# Patient Record
Sex: FEMALE | Race: WHITE | NOT HISPANIC OR LATINO | Employment: OTHER | URBAN - METROPOLITAN AREA
[De-identification: names, ages, dates, MRNs, and addresses within clinical notes are randomized per-mention and may not be internally consistent; named-entity substitution may affect disease eponyms.]

---

## 2017-06-30 ENCOUNTER — TELEPHONE (OUTPATIENT)
Dept: INTERNAL MEDICINE | Facility: CLINIC | Age: 57
End: 2017-06-30

## 2017-06-30 DIAGNOSIS — R09.81 SINUS CONGESTION: Primary | ICD-10-CM

## 2017-06-30 DIAGNOSIS — J31.0 CHRONIC RHINITIS, UNSPECIFIED TYPE: ICD-10-CM

## 2017-06-30 NOTE — TELEPHONE ENCOUNTER
Pt called. Relayed below. Pt stated her nose has been plugged up, and has had drainage pretty much her whole life. Also pt has SOB when going up stairs. Pt is wondering if there is something wrong with her nose, or septum, or something.       Mya-not sure what dx to use?

## 2017-06-30 NOTE — TELEPHONE ENCOUNTER
FHN: [facility] [phone]   [email or web address]  Referral     If she is more SOB with activity we should do more testing  If this is normal for her, ENT referral ok

## 2017-06-30 NOTE — TELEPHONE ENCOUNTER
Per Mya-pt is sched for an appt on 7/6 for an ENT referral. Can just do referral w/o being seen. Just needs to find out what she needs referral for        Called pt-left message     When referral done, cxl appt in Epic

## 2017-09-15 ENCOUNTER — OFFICE VISIT (OUTPATIENT)
Dept: INTERNAL MEDICINE | Facility: CLINIC | Age: 57
End: 2017-09-15
Payer: COMMERCIAL

## 2017-09-15 VITALS
BODY MASS INDEX: 26.06 KG/M2 | DIASTOLIC BLOOD PRESSURE: 64 MMHG | OXYGEN SATURATION: 96 % | HEART RATE: 94 BPM | SYSTOLIC BLOOD PRESSURE: 96 MMHG | TEMPERATURE: 98.2 F | WEIGHT: 166 LBS | HEIGHT: 67 IN

## 2017-09-15 DIAGNOSIS — E78.00 HYPERCHOLESTEREMIA: ICD-10-CM

## 2017-09-15 DIAGNOSIS — Z13.6 CARDIOVASCULAR SCREENING; LDL GOAL LESS THAN 130: ICD-10-CM

## 2017-09-15 DIAGNOSIS — Z00.01 ENCOUNTER FOR GENERAL ADULT MEDICAL EXAMINATION WITH ABNORMAL FINDINGS: Primary | ICD-10-CM

## 2017-09-15 DIAGNOSIS — G47.19 EXCESSIVE DAYTIME SLEEPINESS: ICD-10-CM

## 2017-09-15 DIAGNOSIS — R06.09 DOE (DYSPNEA ON EXERTION): ICD-10-CM

## 2017-09-15 DIAGNOSIS — R53.83 OTHER FATIGUE: ICD-10-CM

## 2017-09-15 LAB
BASOPHILS # BLD AUTO: 0.1 10E9/L (ref 0–0.2)
BASOPHILS NFR BLD AUTO: 0.8 %
DIFFERENTIAL METHOD BLD: NORMAL
EOSINOPHIL # BLD AUTO: 0.3 10E9/L (ref 0–0.7)
EOSINOPHIL NFR BLD AUTO: 4.2 %
ERYTHROCYTE [DISTWIDTH] IN BLOOD BY AUTOMATED COUNT: 12.8 % (ref 10–15)
HCT VFR BLD AUTO: 44.4 % (ref 35–47)
HGB BLD-MCNC: 14.5 G/DL (ref 11.7–15.7)
LYMPHOCYTES # BLD AUTO: 2.4 10E9/L (ref 0.8–5.3)
LYMPHOCYTES NFR BLD AUTO: 40.4 %
MCH RBC QN AUTO: 30.5 PG (ref 26.5–33)
MCHC RBC AUTO-ENTMCNC: 32.7 G/DL (ref 31.5–36.5)
MCV RBC AUTO: 94 FL (ref 78–100)
MONOCYTES # BLD AUTO: 0.5 10E9/L (ref 0–1.3)
MONOCYTES NFR BLD AUTO: 8.3 %
NEUTROPHILS # BLD AUTO: 2.7 10E9/L (ref 1.6–8.3)
NEUTROPHILS NFR BLD AUTO: 46.3 %
PLATELET # BLD AUTO: 221 10E9/L (ref 150–450)
RBC # BLD AUTO: 4.75 10E12/L (ref 3.8–5.2)
WBC # BLD AUTO: 5.9 10E9/L (ref 4–11)

## 2017-09-15 PROCEDURE — 83550 IRON BINDING TEST: CPT | Performed by: NURSE PRACTITIONER

## 2017-09-15 PROCEDURE — 80061 LIPID PANEL: CPT | Performed by: NURSE PRACTITIONER

## 2017-09-15 PROCEDURE — 36415 COLL VENOUS BLD VENIPUNCTURE: CPT | Performed by: NURSE PRACTITIONER

## 2017-09-15 PROCEDURE — 99396 PREV VISIT EST AGE 40-64: CPT | Performed by: NURSE PRACTITIONER

## 2017-09-15 PROCEDURE — 80050 GENERAL HEALTH PANEL: CPT | Performed by: NURSE PRACTITIONER

## 2017-09-15 PROCEDURE — 82306 VITAMIN D 25 HYDROXY: CPT | Performed by: NURSE PRACTITIONER

## 2017-09-15 PROCEDURE — 83540 ASSAY OF IRON: CPT | Performed by: NURSE PRACTITIONER

## 2017-09-15 PROCEDURE — 82728 ASSAY OF FERRITIN: CPT | Performed by: NURSE PRACTITIONER

## 2017-09-15 NOTE — MR AVS SNAPSHOT
After Visit Summary   9/15/2017    Shira Li    MRN: [id number]           Patient Information     Date Of Birth          1960        Visit Information        Provider Department      9/15/2017 9:20 AM Mya Pino APRN [facility]        Today's Diagnoses     Encounter for general adult medical examination with abnormal findings    -  1    Other fatigue        CARDIOVASCULAR SCREENING; LDL GOAL LESS THAN 130        Hypercholesteremia        MUNOZ (dyspnea on exertion)        Excessive daytime sleepiness          Care Instructions    Lab in [address]    [facility] [phone]   [email or web address]  Call for an appointment      [facility] [phone]  Call for an appointment           Follow-ups after your visit        Additional Services     PULMONARY MEDICINE REFERRAL       Your provider has referred you to: [facility] [phone]   [email or web address]    Please be aware that coverage of these services is subject to the terms and limitations of your health insurance plan.  Call member services at your health plan with any benefit or coverage questions.      Please bring the following with you to your appointment:    (1) Any X-Rays, CTs or MRIs which have been performed.  Contact the facility where they were done to arrange for  prior to your scheduled appointment.    (2) List of current medications   (3) This referral request   (4) Any documents/labs given to you for this referral            SLEEP EVALUATION & MANAGEMENT REFERRAL - ADULT       Please be aware that coverage of these services is subject to the terms and limitations of your health insurance plan.  Call member services at your health plan with any benefit or coverage questions.      Please bring the following to your appointment:    >>   List of current medications   >>   This referral  "request   >>   Any documents/labs given to you for this referral    Other:  [facility] [phone]                  Future tests that were ordered for you today     Open Future Orders        Priority Expected Expires Ordered    SLEEP EVALUATION & MANAGEMENT REFERRAL - ADULT Routine  9/15/2018 9/15/2017            Who to contact     If you have questions or need follow up information about today's clinic visit or your schedule please contact [facility] directly at [phone].  Normal or non-critical lab and imaging results will be communicated to you by MyChart, letter or phone within 4 business days after the clinic has received the results. If you do not hear from us within 7 days, please contact the clinic through spigithart or phone. If you have a critical or abnormal lab result, we will notify you by phone as soon as possible.  Submit refill requests through [employer] or call your pharmacy and they will forward the refill request to us. Please allow 3 business days for your refill to be completed.          Additional Information About Your Visit        [facility]itThe [facility]POTAT[facility]OFT Information     TheBankCloud lets you send messages to your doctor, view your test results, renew your prescriptions, schedule appointments and more. To sign up, go to [email or web address] . Click on \"Log in\" on the left side of the screen, which will take you to the Welcome page. Then click on \"Sign up Now\" on the right side of the page.     You will be asked to enter the access code listed below, as well as some personal information. Please follow the directions to create your username and password.     Your access code is: [email or web address]  Expires: 2017 10:22 AM     Your access code will  in 90 days. If you need help or a new code, please call your [facility] or [phone].        Care EveryWhere ID     This is your Care EveryWhere ID. This could be used by other organizations to access your " "[place] medical records  [id number]        Your Vitals Were     Pulse Temperature Height Last Period Pulse Oximetry BMI (Body Mass Index)    94 98.2  F (36.8  C) (Oral) 5' 7\" (1.702 m) 05/18/2012 96% 26 kg/m2       Blood Pressure from Last 3 Encounters:   09/15/17 96/64   04/27/16 114/78   03/16/15 106/80    Weight from Last 3 Encounters:   09/15/17 166 lb (75.3 kg)   04/27/16 163 lb 1.6 oz (74 kg)   03/16/15 157 lb 8 oz (71.4 kg)              We Performed the Following     CBC with platelets differential     Comprehensive metabolic panel     Ferritin     Iron and iron binding capacity     Lipid panel reflex to direct LDL     PULMONARY MEDICINE REFERRAL     TSH with free T4 reflex     Vitamin D Deficiency        Primary Care Provider Office Phone # Fax #    YSABEL Preciado [facility] [phone]       [address] [facility] [address]        Equal Access to Services     GIANCARLO PIERRE : Hadii aad ku hadasho Soomaali, waaxda luqadaha, qaybta kaalmada adeegyada, waxay idiin hayaan adeeg khaltaf altamirano . So [facility] [phone].    ATENCIÓN: Si habla español, tiene a gray disposición servicios gratuitos de asistencia lingüística. Llame al [phone].    We comply with applicable federal civil rights laws and [place] laws. We do not discriminate on the basis of race, color, national origin, age, disability sex, sexual orientation or gender identity.            Thank you!     Thank you for choosing [facility]  for your care. Our goal is always to provide you with excellent care. Hearing back from our patients is one way we can continue to improve our services. Please take a few minutes to complete the written survey that you may receive in the mail after your visit with us. Thank you!             Your Updated Medication List - Protect others around you: Learn how to safely use, store and throw away your medicines at www.disposemymeds.org.          This list is accurate as of: " 9/15/17 10:22 AM.  Always use your most recent med list.                   Brand Name Dispense Instructions for use Diagnosis    ADVIL 200 MG tablet   Generic drug:  ibuprofen      Take 400 mg by mouth every 6 hours as needed for mild pain        CALCIUM 1000 + D 1000-800 MG-UNIT Tabs   Generic drug:  Calcium Carb-Cholecalciferol           Multiple vitamin  s Tabs     30 tablet

## 2017-09-15 NOTE — NURSING NOTE
"Chief Complaint   Patient presents with     Physical     fasting       Initial BP 96/64 (BP Location: Left arm, Patient Position: Sitting, Cuff Size: Adult Large)  Pulse 94  Temp 98.2  F (36.8  C) (Oral)  Ht 5' 7\" (1.702 m)  Wt 166 lb (75.3 kg)  LMP 05/18/2012  SpO2 96%  BMI 26 kg/m2 Estimated body mass index is 26 kg/(m^2) as calculated from the following:    Height as of this encounter: 5' 7\" (1.702 m).    Weight as of this encounter: 166 lb (75.3 kg).  Medication Reconciliation: complete    "

## 2017-09-15 NOTE — PATIENT INSTRUCTIONS
Lab in [address]    [facility] [place] [facility] [phone]   [email or web address]  Call for an appointment      [facility] [phone]  Call for an appointment

## 2017-09-15 NOTE — PROGRESS NOTES
SUBJECTIVE:   CC: Shira Li is an 56 year old woman who presents for preventive health visit.     Physical   Annual:     Getting at least 3 servings of Calcium per day::  NO    Bi-annual eye exam::  Yes    Dental care twice a year::  NO    Sleep apnea or symptoms of sleep apnea::  Daytime drowsiness    Diet::  Gluten-free/reduced, Breakfast skipped and Other    Frequency of exercise::  6-7 days/week    Duration of exercise::  45-60 minutes    Taking medications regularly::  Not Applicable    Medication side effects::  Other    Additional concerns today::  YES      MUNOZ continues with walking up an incline  She does and hour on stationary bike without problem   She has had angiogram with clear vessels - done for abnormal stress test   Has not done pulmonary or sleep referral given her last year  Will do now  Her father  in January and now has more time to do her own health care     Has to be vigilant about her diet ; cheat with pizza once a week- this may be bothering her   No gluten or wheat usually         Today's PHQ-2 Score:   PHQ-2 (  [employer]) 9/15/2017   Q1: Little interest or pleasure in doing things 0   Q2: Feeling down, depressed or hopeless 0   PHQ-2 Score 0   Q1: Little interest or pleasure in doing things Not at all   Q2: Feeling down, depressed or hopeless Not at all   PHQ-2 Score 0       Abuse: Current or Past(Physical, Sexual or Emotional)- No  Do you feel safe in your environment - Yes    Social History   Substance Use Topics     Smoking status: Never Smoker     Smokeless tobacco: Never Used     Alcohol use Yes      Comment: 1 drink per month     The patient does not drink >3 drinks per day nor >7 drinks per week.    Reviewed orders with patient.  Reviewed health maintenance and updated orders accordingly - Yes          Pertinent mammograms are reviewed under the imaging tab.  History of abnormal Pap smear: NO - age 30-65 PAP every 5 years with negative HPV co-testing  "recommended    Reviewed and updated as needed this visit by clinical staff  Tobacco  Allergies  Meds  Med Hx  Surg Hx  Fam Hx  Soc Hx        Reviewed and updated as needed this visit by Provider  Allergies              ROS:  C: NEGATIVE for fever, chills, change in weight  I: NEGATIVE for worrisome rashes, moles or lesions  E: NEGATIVE for vision changes or irritation  ENT: NEGATIVE for ear, mouth and throat problems  R: NEGATIVE for significant cough or SOB  B: NEGATIVE for masses, tenderness or discharge  CV: NEGATIVE for chest pain, palpitations or peripheral edema  GI: NEGATIVE for nausea, abdominal pain, heartburn, or change in bowel habits  : NEGATIVE for unusual urinary or vaginal symptoms. No vaginal bleeding.  M: NEGATIVE for significant arthralgias or myalgia  N: NEGATIVE for weakness, dizziness or paresthesias  P: NEGATIVE for changes in mood or affect      OBJECTIVE:   BP 96/64 (BP Location: Left arm, Patient Position: Sitting, Cuff Size: Adult Large)  Pulse 94  Temp 98.2  F (36.8  C) (Oral)  Ht 5' 7\" (1.702 m)  Wt 166 lb (75.3 kg)  LMP 05/18/2012  SpO2 96%  BMI 26 kg/m2  EXAM:  GENERAL APPEARANCE:  alert and no distress  EYES: Eyes grossly normal to inspection, and conjunctivae and sclerae normal  HENT: ear canals and TM's normal, nose and mouth without ulcers or lesions, oropharynx clear and oral mucous membranes moist  NECK: no adenopathy, no asymmetry, masses, or scars and thyroid normal to palpation  RESP: lungs clear to auscultation - no rales, rhonchi or wheezes  BREAST: normal without masses, tenderness or nipple discharge and no palpable axillary masses or adenopathy  CV: regular rate and rhythm, normal S1 S2, no S3 or S4, no murmur, click or rub, no peripheral edema and peripheral pulses strong  ABDOMEN: soft, nontender, no hepatosplenomegaly, no masses and bowel sounds normal  MS: no musculoskeletal defects are noted and gait is age appropriate without ataxia  SKIN: no " "suspicious lesions or rashes  NEURO: Normal strength and tone, sensory exam grossly normal, mentation intact and speech normal  PSYCH: mentation appears normal and affect normal/bright    ASSESSMENT/PLAN:       ICD-10-CM    1. Encounter for general adult medical examination with abnormal findings Z00.01 CBC with platelets differential     Iron and iron binding capacity     Ferritin     TSH with free T4 reflex     Lipid panel reflex to direct LDL     Comprehensive metabolic panel   2. Other fatigue R53.83 CBC with platelets differential     Iron and iron binding capacity     Ferritin     TSH with free T4 reflex     Lipid panel reflex to direct LDL     Comprehensive metabolic panel     Vitamin D Deficiency   3. CARDIOVASCULAR SCREENING; LDL GOAL LESS THAN 130 Z13.6 Lipid panel reflex to direct LDL     Comprehensive metabolic panel   4. Hypercholesteremia E78.00 Lipid panel reflex to direct LDL     Comprehensive metabolic panel   5. MUNOZ (dyspnea on exertion) R06.09 PULMONARY MEDICINE REFERRAL     SLEEP EVALUATION & MANAGEMENT REFERRAL - ADULT     Vitamin D Deficiency   6. Excessive daytime sleepiness G47.19 PULMONARY MEDICINE REFERRAL     SLEEP EVALUATION & MANAGEMENT REFERRAL - ADULT       COUNSELING:  Reviewed preventive health counseling, as reflected in patient instructions       Regular exercise       Healthy diet/nutrition       Osteoporosis Prevention/Bone Health         reports that she has never smoked. She has never used smokeless tobacco.    Estimated body mass index is 26 kg/(m^2) as calculated from the following:    Height as of this encounter: 5' 7\" (1.702 m).    Weight as of this encounter: 166 lb (75.3 kg).         Counseling Resources:  ATP IV Guidelines  Pooled Cohorts Equation Calculator  Breast Cancer Risk Calculator  FRAX Risk Assessment  ICSI Preventive Guidelines  Dietary Guidelines for Americans, 2010  USDA's MyPlate  ASA Prophylaxis  Lung CA Screening    YSABEL Preciado [facility]  [facility] " [facility] for HPI/ROS submitted by the patient on 9/15/2017   PHQ-2 Score: 0

## 2017-09-16 LAB
ALBUMIN SERPL-MCNC: 4.1 G/DL (ref 3.4–5)
ALP SERPL-CCNC: 96 U/L (ref 40–150)
ALT SERPL W P-5'-P-CCNC: 20 U/L (ref 0–50)
ANION GAP SERPL CALCULATED.3IONS-SCNC: 14 MMOL/L (ref 3–14)
AST SERPL W P-5'-P-CCNC: 14 U/L (ref 0–45)
BILIRUB SERPL-MCNC: 0.5 MG/DL (ref 0.2–1.3)
BUN SERPL-MCNC: 16 MG/DL (ref 7–30)
CALCIUM SERPL-MCNC: 9.2 MG/DL (ref 8.5–10.1)
CHLORIDE SERPL-SCNC: 104 MMOL/L (ref 94–109)
CHOLEST SERPL-MCNC: 209 MG/DL
CO2 SERPL-SCNC: 25 MMOL/L (ref 20–32)
CREAT SERPL-MCNC: 0.78 MG/DL (ref 0.52–1.04)
DEPRECATED CALCIDIOL+CALCIFEROL SERPL-MC: 56 UG/L (ref 20–75)
FERRITIN SERPL-MCNC: 43 NG/ML (ref 8–252)
GFR SERPL CREATININE-BSD FRML MDRD: 76 ML/MIN/1.7M2
GLUCOSE SERPL-MCNC: 89 MG/DL (ref 70–99)
HDLC SERPL-MCNC: 58 MG/DL
IRON SATN MFR SERPL: 24 % (ref 15–46)
IRON SERPL-MCNC: 85 UG/DL (ref 35–180)
LDLC SERPL CALC-MCNC: 118 MG/DL
NONHDLC SERPL-MCNC: 151 MG/DL
POTASSIUM SERPL-SCNC: 4.1 MMOL/L (ref 3.4–5.3)
PROT SERPL-MCNC: 7.9 G/DL (ref 6.8–8.8)
SODIUM SERPL-SCNC: 143 MMOL/L (ref 133–144)
TIBC SERPL-MCNC: 355 UG/DL (ref 240–430)
TRIGL SERPL-MCNC: 164 MG/DL
TSH SERPL DL<=0.005 MIU/L-ACNC: 2.07 MU/L (ref 0.4–4)

## 2018-03-05 ENCOUNTER — TELEPHONE (OUTPATIENT)
Dept: INTERNAL MEDICINE | Facility: CLINIC | Age: 58
End: 2018-03-05

## 2018-03-05 NOTE — TELEPHONE ENCOUNTER
3/5/2018    Call Regarding Preventive Health Screening Mammogram and Cervical/PAP    Attempt 1    Message on voicemail    Comments:       Outreach   Misa Banuelos

## 2018-03-13 NOTE — TELEPHONE ENCOUNTER
3/13/2018    Call Regarding Preventive Health Screening Mammogram and Cervical/PAP    Attempt 2    Message on voicemail    Comments:       Outreach   Misa Banuelos

## 2018-03-20 NOTE — TELEPHONE ENCOUNTER
3/20/2018    Call Regarding Preventive Health Screening Mammogram and Cervical/PAP    Attempt 3    Message on voicemail     Comments:       Outreach   CC

## 2018-04-08 ENCOUNTER — HEALTH MAINTENANCE LETTER (OUTPATIENT)
Age: 58
End: 2018-04-08

## 2018-04-23 ENCOUNTER — TRANSFERRED RECORDS (OUTPATIENT)
Dept: HEALTH INFORMATION MANAGEMENT | Facility: CLINIC | Age: 58
End: 2018-04-23

## 2018-06-18 ENCOUNTER — RADIANT APPOINTMENT (OUTPATIENT)
Dept: GENERAL RADIOLOGY | Facility: CLINIC | Age: 58
End: 2018-06-18
Payer: COMMERCIAL

## 2018-06-18 ENCOUNTER — OFFICE VISIT (OUTPATIENT)
Dept: INTERNAL MEDICINE | Facility: CLINIC | Age: 58
End: 2018-06-18
Payer: COMMERCIAL

## 2018-06-18 VITALS
HEART RATE: 67 BPM | SYSTOLIC BLOOD PRESSURE: 102 MMHG | OXYGEN SATURATION: 96 % | TEMPERATURE: 98 F | DIASTOLIC BLOOD PRESSURE: 64 MMHG

## 2018-06-18 DIAGNOSIS — B37.0 THRUSH: ICD-10-CM

## 2018-06-18 DIAGNOSIS — R05.9 COUGH: Primary | ICD-10-CM

## 2018-06-18 DIAGNOSIS — R05.9 COUGH: ICD-10-CM

## 2018-06-18 PROCEDURE — 36415 COLL VENOUS BLD VENIPUNCTURE: CPT | Performed by: INTERNAL MEDICINE

## 2018-06-18 PROCEDURE — 99214 OFFICE O/P EST MOD 30 MIN: CPT | Performed by: INTERNAL MEDICINE

## 2018-06-18 PROCEDURE — 86480 TB TEST CELL IMMUN MEASURE: CPT | Performed by: INTERNAL MEDICINE

## 2018-06-18 PROCEDURE — 71046 X-RAY EXAM CHEST 2 VIEWS: CPT

## 2018-06-18 RX ORDER — ALBUTEROL SULFATE 90 UG/1
2 AEROSOL, METERED RESPIRATORY (INHALATION) EVERY 6 HOURS PRN
Qty: 1 INHALER | Refills: 1 | Status: SHIPPED | OUTPATIENT
Start: 2018-06-18 | End: 2019-09-06

## 2018-06-18 RX ORDER — FLUCONAZOLE 100 MG/1
TABLET ORAL
Qty: 15 TABLET | Refills: 0 | Status: SHIPPED | OUTPATIENT
Start: 2018-06-18 | End: 2019-09-06

## 2018-06-18 NOTE — MR AVS SNAPSHOT
"              After Visit Summary   6/18/2018    Shira Li    MRN: [id number]           Patient Information     Date Of Birth          1960        Visit Information        Provider Department      6/18/2018 11:15 AM Stan Elam MD [facility]        Today's Diagnoses     Cough    -  1    Thrush           Follow-ups after your visit        Your next 10 appointments already scheduled     Jun 19, 2018 11:00 AM CDT   SHORT with Jyoti Hernadez PA-C   [facility]    [address]  [facility] [address]   [phone]              Who to contact     If you have questions or need follow up information about today's clinic visit or your schedule please contact [facility] directly at [phone].  Normal or non-critical lab and imaging results will be communicated to you by MyChart, letter or phone within 4 business days after the clinic has received the results. If you do not hear from us within 7 days, please contact the clinic through MyChart or phone. If you have a critical or abnormal lab result, we will notify you by phone as soon as possible.  Submit refill requests through Zeer or call your pharmacy and they will forward the refill request to us. Please allow 3 business days for your refill to be completed.          Additional Information About Your Visit        MyChart Information     Zeer lets you send messages to your doctor, view your test results, renew your prescriptions, schedule appointments and more. To sign up, go to [email or web address] . Click on \"Log in\" on the left side of the screen, which will take you to the Welcome page. Then click on \"Sign up Now\" on the right side of the page.     You will be asked to enter the access code listed below, as well as some personal information. Please follow the directions to create your username and password.     Your access code is: " [email or web address]  Expires: 2018  2:53 PM     Your access code will  in 90 days. If you need help or a new code, please call your [place] clinic or [phone].        Care EveryWhere ID     This is your Care EveryWhere ID. This could be used by other organizations to access your [place] medical records  [id number]        Your Vitals Were     Pulse Temperature Last Period Pulse Oximetry          67 98  F (36.7  C) 2012 96%         Blood Pressure from Last 3 Encounters:   18 102/64   09/15/17 96/64   16 114/78    Weight from Last 3 Encounters:   09/15/17 166 lb (75.3 kg)   16 163 lb 1.6 oz (74 kg)   03/16/15 157 lb 8 oz (71.4 kg)              We Performed the Following     M Tuberculosis by Quantiferon          Today's Medication Changes          These changes are accurate as of 18  2:53 PM.  If you have any questions, ask your nurse or doctor.               Start taking these medicines.        Dose/Directions    albuterol 108 (90 Base) MCG/ACT Inhaler   Commonly known as:  PROAIR HFA/PROVENTIL HFA/VENTOLIN HFA   Used for:  Cough   Started by:  Stan Elam MD        Dose:  2 puff   Inhale 2 puffs into the lungs every 6 hours as needed for shortness of breath / dyspnea or wheezing   Quantity:  1 Inhaler   Refills:  1       fluconazole 100 MG tablet   Commonly known as:  DIFLUCAN   Used for:  Thrush   Started by:  Stan Elam MD        Take 100mg daily for 3 days (extra tablets given)   Quantity:  15 tablet   Refills:  0            Where to get your medicines      These medications were sent to [facility] [address] [place] [address] [address] AT [place] [address]  [id number] [place] [address]     Phone:  [phone]     albuterol 108 (90 Base) MCG/ACT Inhaler    fluconazole 100 MG tablet                Primary Care Provider Office Phone # Fax #    YSABEL Preciado [phone]       [address]  [facility] [address]        Equal Access to Services     [facility] Medical CenterHUNTER : Hadii imani cervantes carlos a Arevalo, waaxda luqadaha, qaybta kaalvinasudhir barragan, christophe espinoalanamarco antonio sy. So [facility] [phone].    ATENCIÓN: Si habla español, tiene a gray disposición servicios gratuitos de asistencia lingüística. Jennaame al [phone].    We comply with applicable federal civil rights laws and [place] laws. We do not discriminate on the basis of race, color, national origin, age, disability, sex, sexual orientation, or gender identity.            Thank you!     Thank you for choosing [facility]  for your care. Our goal is always to provide you with excellent care. Hearing back from our patients is one way we can continue to improve our services. Please take a few minutes to complete the written survey that you may receive in the mail after your visit with us. Thank you!             Your Updated Medication List - Protect others around you: Learn how to safely use, store and throw away your medicines at www.disposemymeds.org.          This list is accurate as of 6/18/18  2:53 PM.  Always use your most recent med list.                   Brand Name Dispense Instructions for use Diagnosis    ADVIL 200 MG tablet   Generic drug:  ibuprofen      Take 400 mg by mouth every 6 hours as needed for mild pain        albuterol 108 (90 Base) MCG/ACT Inhaler    PROAIR HFA/PROVENTIL HFA/VENTOLIN HFA    1 Inhaler    Inhale 2 puffs into the lungs every 6 hours as needed for shortness of breath / dyspnea or wheezing    Cough       CALCIUM 1000 + D 1000-800 MG-UNIT Tabs   Generic drug:  Calcium Carb-Cholecalciferol           fluconazole 100 MG tablet    DIFLUCAN    15 tablet    Take 100mg daily for 3 days (extra tablets given)    Thrush       Multiple vitamin  s Tabs     30 tablet

## 2018-06-18 NOTE — PROGRESS NOTES
SUBJECTIVE:   Shira Li is a 57 year old female who presents to clinic today for the following health issues:      Cough  Patient presents with dry cough two weeks after trip to [place]    This cough started during her trip and continued after she returned    She has had no fevers.  She also had episode of thrush on her tongue, she had diflucan at home from before and took one and thrush improved but still present.     No hemoptysis.          Problem list and histories reviewed & adjusted, as indicated.  Additional history: as documented    Patient Active Problem List   Diagnosis     Encounter for counseling     CARDIOVASCULAR SCREENING; LDL GOAL LESS THAN 130     Hollenhorst plaque, left eye     Advanced directives, counseling/discussion     Hypercholesteremia     MUNOZ (dyspnea on exertion)     Status post coronary angiogram     Past Surgical History:   Procedure Laterality Date     HEART CATH LEFT HEART CATH  03-06-15    Angiographic normal coronary arteries      ORTHOPEDIC SURGERY  1967    leg fracture        Social History   Substance Use Topics     Smoking status: Never Smoker     Smokeless tobacco: Never Used     Alcohol use Yes      Comment: 1 drink per month     Family History   Problem Relation Age of Onset     DIABETES Mother       at age 30 yrs     C.A.D. Father 48     stents and CABG     Lipids Father      Coronary Artery Disease Father      MI     Allergy (Severe) Father      very allergic to medication     HEART DISEASE Other      multiple family members wt valve replacement      Obesity Other      mult females with morbid obesity.     CEREBROVASCULAR DISEASE Maternal Grandmother      Myocardial Infarction Maternal Grandmother      multiple MI's      Arthritis Maternal Grandmother      RA     Hypertension Maternal Grandmother      Psychotic Disorder Son      Asbergers      Coronary Artery Disease Maternal Grandfather      MI           Reviewed and updated as needed this visit by  clinical staff       Reviewed and updated as needed this visit by Provider         ROS:  Constitutional, HEENT, cardiovascular, pulmonary, gi and gu systems are negative, except as otherwise noted.    OBJECTIVE:     /64  Pulse 67  Temp 98  F (36.7  C)  LMP 05/18/2012  SpO2 96%  There is no height or weight on file to calculate BMI.  GENERAL: healthy, alert and no distress  HENT: Oral mucosa and tongue with white plaque  NECK: no adenopathy, no asymmetry, masses, or scars and thyroid normal to palpation  RESP: lungs clear to auscultation -with faint end expiratory wheezing  CV: regular rate and rhythm, normal S1 S2, no S3 or S4, no murmur, click or rub, no peripheral edema and peripheral pulses strong  MS: no gross musculoskeletal defects noted, no edema    Diagnostic Test Results:  none     ASSESSMENT/PLAN:       1. Cough    Cough in a returning traveler from [facility].    Again discussed with patient, similar to her son-this is likely viral in nature.     I considered MERS however they didn't have any travel directly in [place] so less likely    Will get cxr and TB test to rule out any possibility of TB.    Albuterol prn for wheezing      - XR Chest 2 Views; Future  - M Tuberculosis by Quantiferon  - albuterol (PROAIR HFA/PROVENTIL HFA/VENTOLIN HFA) 108 (90 Base) MCG/ACT Inhaler; Inhale 2 puffs into the lungs every 6 hours as needed for shortness of breath / dyspnea or wheezing  Dispense: 1 Inhaler; Refill: 1    2. Thrush  Exam showing some evidence of thrush, will give her a course to take    - fluconazole (DIFLUCAN) 100 MG tablet; Take 100mg daily for 3 days (extra tablets given)  Dispense: 15 tablet; Refill: 0      Stan Elam MD  [facility]

## 2018-06-20 ENCOUNTER — TELEPHONE (OUTPATIENT)
Dept: INTERNAL MEDICINE | Facility: CLINIC | Age: 58
End: 2018-06-20

## 2018-06-20 LAB
M TB TUBERC IFN-G BLD QL: NEGATIVE
M TB TUBERC IFN-G/MITOGEN IGNF BLD: 0 IU/ML

## 2018-06-20 NOTE — LETTER
[facility]  [address], [address]  [place], [place]  [address]                                            TEL:[phone]  FAX:[phone]      Shira Tejada [address] [address]      July 5, 2018    Dear Shira,      At [facility], we care about your health and well-being. A review of your chart has indicated that you are due for a mammogram and a pap smear. Please contact us at [phone] to schedule an appointment.     If you have already had one or all of the above screening tests at another facility, please call us to update your chart.        Sincerely,      JOSIE Lee

## 2018-06-20 NOTE — LETTER
[facility]  [address], [address]  [place], [place]  [address]                                            TEL:[phone]  FAX:[phone]      Shira Tejada [address] [address]      June 20, 2018    Dear Shira,    At [facility], we care about your health and well-being. A review of your chart has indicated that you are due for a mammogram and pap smear. Please contact us at [phone] to schedule an appointment.     If you have already had one or all of the above screening tests at another facility, please call us to update your chart.            Sincerely,      JOSIE Lee

## 2018-06-20 NOTE — TELEPHONE ENCOUNTER
Panel Management Review      Patient has the following on her problem list: None      Composite cancer screening  Chart review shows that this patient is due/due soon for the following Pap Smear and Mammogram  Summary:    Patient is due/failing the following:   MAMMOGRAM and PAP    Action needed:   Patient needs office visit for see above.    Type of outreach:    Sent letter.    Questions for provider review:    None                                                                                                                                    .LEVAR HERRERA LPN       Chart routed to none .

## 2018-06-20 NOTE — LETTER
[facility]  [address], [address]  [place], [place]  [address]                                            TEL:[phone]  FAX:[phone]      Shira Tejada [address] [address]      June 20, 2018    Dear Shira,      At [facility], we care about your health and well-being. A review of your chart has indicated that you are due for a mammogram and pap smear. Please contact us at [phone] to schedule an appointment.     If you have already had one or all of the above screening tests at another facility, please call us to update your chart.          Sincerely,      JOSIE Lee

## 2018-11-23 ENCOUNTER — TELEPHONE (OUTPATIENT)
Dept: INTERNAL MEDICINE | Facility: CLINIC | Age: 58
End: 2018-11-23

## 2018-11-23 NOTE — LETTER
[facility]  [address], [address]  [place] [address]  [phone]        November 23, 2018    Shira Tejada [facility]  [address] [address] [facility] [address]            Dear Shira,  In order to ensure we are providing the best quality care, we have reviewed your chart and see that you are due for a physical, pap, & mammogram.  Please call the clinic at your earliest convenience to schedule an appointment.   Thank you for trusting us with your health care.      Sincerely,        Dr. Stan Elam

## 2018-11-23 NOTE — LETTER
[facility]  [address], [address]  [place] [address]  [phone]        December 11, 2018    Shira Li  [address] [address]            Dear Shira,    We sent you a letter a couple of weeks ago informing you of health maintenance that is due. We hope that you received it. This letter is just a follow up to remind you to schedule an appointment.         Sincerely,        Your [facility] Care Team

## 2018-11-23 NOTE — TELEPHONE ENCOUNTER
Panel Management Review      Patient has the following on her problem list: None      Composite cancer screening  Chart review shows that this patient is due/due soon for the following Pap Smear and Mammogram  Summary:    Patient is due/failing the following:   MAMMOGRAM and PAP    Action needed:   Patient needs office visit for physical, pap, & mammogram.    Type of outreach:    Sent letter.    Questions for provider review:    None                                                                                                                                    Chrsity Santiago MA       Chart routed to none .

## 2018-11-26 ENCOUNTER — TELEPHONE (OUTPATIENT)
Dept: INTERNAL MEDICINE | Facility: CLINIC | Age: 58
End: 2018-11-26

## 2019-05-08 ENCOUNTER — APPOINTMENT (OUTPATIENT)
Dept: ULTRASOUND IMAGING | Facility: CLINIC | Age: 59
End: 2019-05-08
Attending: EMERGENCY MEDICINE
Payer: COMMERCIAL

## 2019-05-08 ENCOUNTER — HOSPITAL ENCOUNTER (EMERGENCY)
Facility: CLINIC | Age: 59
Discharge: HOME | End: 2019-05-08
Attending: EMERGENCY MEDICINE | Admitting: EMERGENCY MEDICINE
Payer: COMMERCIAL

## 2019-05-08 VITALS
OXYGEN SATURATION: 97 % | HEART RATE: 80 BPM | BODY MASS INDEX: 24.28 KG/M2 | RESPIRATION RATE: 18 BRPM | DIASTOLIC BLOOD PRESSURE: 70 MMHG | TEMPERATURE: 98.2 F | SYSTOLIC BLOOD PRESSURE: 109 MMHG | WEIGHT: 155 LBS

## 2019-05-08 DIAGNOSIS — K80.50 BILIARY COLIC: ICD-10-CM

## 2019-05-08 LAB
ALBUMIN SERPL-MCNC: 3.6 G/DL (ref 3.4–5)
ALBUMIN UR-MCNC: 200 MG/DL
ALP SERPL-CCNC: 111 U/L (ref 40–150)
ALT SERPL W P-5'-P-CCNC: 64 U/L (ref 0–50)
ANION GAP SERPL CALCULATED.3IONS-SCNC: 5 MMOL/L (ref 3–14)
APPEARANCE UR: CLEAR
AST SERPL W P-5'-P-CCNC: 100 U/L (ref 0–45)
BASOPHILS # BLD AUTO: 0 10E9/L (ref 0–0.2)
BASOPHILS NFR BLD AUTO: 0.6 %
BILIRUB SERPL-MCNC: 0.9 MG/DL (ref 0.2–1.3)
BILIRUB UR QL STRIP: NEGATIVE
BUN SERPL-MCNC: 12 MG/DL (ref 7–30)
CALCIUM SERPL-MCNC: 8.2 MG/DL (ref 8.5–10.1)
CHLORIDE SERPL-SCNC: 103 MMOL/L (ref 94–109)
CO2 SERPL-SCNC: 34 MMOL/L (ref 20–32)
COLOR UR AUTO: ABNORMAL
CREAT SERPL-MCNC: 0.72 MG/DL (ref 0.52–1.04)
DIFFERENTIAL METHOD BLD: NORMAL
EOSINOPHIL # BLD AUTO: 0.2 10E9/L (ref 0–0.7)
EOSINOPHIL NFR BLD AUTO: 2.2 %
ERYTHROCYTE [DISTWIDTH] IN BLOOD BY AUTOMATED COUNT: 12.4 % (ref 10–15)
GFR SERPL CREATININE-BSD FRML MDRD: >90 ML/MIN/{1.73_M2}
GLUCOSE SERPL-MCNC: 104 MG/DL (ref 70–99)
GLUCOSE UR STRIP-MCNC: NEGATIVE MG/DL
HCT VFR BLD AUTO: 42.7 % (ref 35–47)
HGB BLD-MCNC: 14.6 G/DL (ref 11.7–15.7)
HGB UR QL STRIP: NEGATIVE
IMM GRANULOCYTES # BLD: 0 10E9/L (ref 0–0.4)
IMM GRANULOCYTES NFR BLD: 0.3 %
INTERPRETATION ECG - MUSE: NORMAL
KETONES UR STRIP-MCNC: 40 MG/DL
LEUKOCYTE ESTERASE UR QL STRIP: ABNORMAL
LIPASE SERPL-CCNC: 131 U/L (ref 73–393)
LYMPHOCYTES # BLD AUTO: 2.1 10E9/L (ref 0.8–5.3)
LYMPHOCYTES NFR BLD AUTO: 28.9 %
MCH RBC QN AUTO: 30.7 PG (ref 26.5–33)
MCHC RBC AUTO-ENTMCNC: 34.2 G/DL (ref 31.5–36.5)
MCV RBC AUTO: 90 FL (ref 78–100)
MONOCYTES # BLD AUTO: 0.6 10E9/L (ref 0–1.3)
MONOCYTES NFR BLD AUTO: 8.4 %
MUCOUS THREADS #/AREA URNS LPF: PRESENT /LPF
NEUTROPHILS # BLD AUTO: 4.3 10E9/L (ref 1.6–8.3)
NEUTROPHILS NFR BLD AUTO: 59.6 %
NITRATE UR QL: NEGATIVE
NRBC # BLD AUTO: 0 10*3/UL
NRBC BLD AUTO-RTO: 0 /100
PH UR STRIP: 8.5 PH (ref 5–7)
PLATELET # BLD AUTO: 208 10E9/L (ref 150–450)
POTASSIUM SERPL-SCNC: 3.5 MMOL/L (ref 3.4–5.3)
PROT SERPL-MCNC: 7.4 G/DL (ref 6.8–8.8)
RBC # BLD AUTO: 4.76 10E12/L (ref 3.8–5.2)
RBC #/AREA URNS AUTO: 2 /HPF (ref 0–2)
SODIUM SERPL-SCNC: 142 MMOL/L (ref 133–144)
SOURCE: ABNORMAL
SP GR UR STRIP: 1.02 (ref 1–1.03)
SQUAMOUS #/AREA URNS AUTO: <1 /HPF (ref 0–1)
TROPONIN I SERPL-MCNC: <0.015 UG/L (ref 0–0.04)
UROBILINOGEN UR STRIP-MCNC: NORMAL MG/DL (ref 0–2)
WBC # BLD AUTO: 7.2 10E9/L (ref 4–11)
WBC #/AREA URNS AUTO: 8 /HPF (ref 0–5)

## 2019-05-08 PROCEDURE — 81001 URINALYSIS AUTO W/SCOPE: CPT | Performed by: EMERGENCY MEDICINE

## 2019-05-08 PROCEDURE — 80053 COMPREHEN METABOLIC PANEL: CPT | Performed by: EMERGENCY MEDICINE

## 2019-05-08 PROCEDURE — 25000128 H RX IP 250 OP 636: Performed by: EMERGENCY MEDICINE

## 2019-05-08 PROCEDURE — 99285 EMERGENCY DEPT VISIT HI MDM: CPT | Mod: 25

## 2019-05-08 PROCEDURE — 76705 ECHO EXAM OF ABDOMEN: CPT

## 2019-05-08 PROCEDURE — 85025 COMPLETE CBC W/AUTO DIFF WBC: CPT | Performed by: EMERGENCY MEDICINE

## 2019-05-08 PROCEDURE — 96374 THER/PROPH/DIAG INJ IV PUSH: CPT

## 2019-05-08 PROCEDURE — 87086 URINE CULTURE/COLONY COUNT: CPT | Performed by: EMERGENCY MEDICINE

## 2019-05-08 PROCEDURE — 84484 ASSAY OF TROPONIN QUANT: CPT | Performed by: EMERGENCY MEDICINE

## 2019-05-08 PROCEDURE — 83690 ASSAY OF LIPASE: CPT | Performed by: EMERGENCY MEDICINE

## 2019-05-08 PROCEDURE — 93005 ELECTROCARDIOGRAM TRACING: CPT

## 2019-05-08 RX ORDER — KETOROLAC TROMETHAMINE 15 MG/ML
15 INJECTION, SOLUTION INTRAMUSCULAR; INTRAVENOUS ONCE
Status: COMPLETED | OUTPATIENT
Start: 2019-05-08 | End: 2019-05-08

## 2019-05-08 RX ADMIN — KETOROLAC TROMETHAMINE 15 MG: 15 INJECTION, SOLUTION INTRAMUSCULAR; INTRAVENOUS at 05:09

## 2019-05-08 ASSESSMENT — ENCOUNTER SYMPTOMS
CHILLS: 1
FREQUENCY: 0
VOMITING: 0
FEVER: 0
DYSURIA: 0
BLOOD IN STOOL: 0
HEADACHES: 1
APPETITE CHANGE: 1
CONSTIPATION: 0
ABDOMINAL PAIN: 1

## 2019-05-08 NOTE — ED PROVIDER NOTES
"  History     Chief Complaint:  Abdominal Pain      HPI   Shira Li is a 58 year old female who presents with abdominal pain. The patient says that on Monday 5/6 she had a meeting and during the meeting she had to go to the restroom multiple times to vomit. Afterwards she reports dry heaving and a bad migraine. She says that up until recently she had been on a gluten free diet due to a PST enzyme deficiency though her diet had relapsed, and she acknowledges eating more greasy foods including pizza as well as chocolate. The patient says that the pain is located in her upper right abdomen and that it has started to radiate towards her back and keeps going back and forth between her front and back. She rated the pain at a 5/10 and says that it feels like \" a huge rubber band getting tighter\" and that the waves also come with chills and heat flashes. The patient says that she has been eating less in order to avoid any more vomiting.  Her pain is exacerbated by eating, and typically worsens about 2 hours after eating. The patient confirms lighter looking stool, and a family history of gallstones, though has no personal history of gallstones.The patient denies any more vomiting since yesterday, constipation, fever, blood in stool, dysuria, change in urinary frequency, alcohol use, or abdominal surgeries. Of note, when asked if she had any undercooked or old food the patient replied that she had some friend shrimp that may have been old.    Allergies:  Sulfa drugs  Tylenol  1-methyl 2-pyrrolidone  Casein   Gluten meal  Yellow dyes     Medications:    Albuterol  Diflucan  Advil    Past Medical History:    Broken arm  Dyspnea on exertion  Fibula fracture  Hollenhorst plaque- left eye  Hypercholesterolemia      Past Surgical History:    Heart cath left heart cath  Orthopedic surgery    Family History:    Mother: diabetes  Father: CAD, lipids, heart attack  Maternal grandmother: heart attack, arthritis, " hypertension  Son: psychotic disorder  Maternal grandfather: CAD    Social History:  The patient was accompanied to the ED by .  Smoking Status: Never Smoker  Smokeless Tobacco: Never Used  Alcohol Use: Positive  Drug Use: Negative  PCP: Mya Pino  Marital Status:     Social History     Tobacco Use     Smoking status: Never Smoker     Smokeless tobacco: Never Used   Substance Use Topics     Alcohol use: Yes     Comment: 1 drink per month     Drug use: No        Review of Systems   Constitutional: Positive for appetite change and chills. Negative for fever.        Heat flashes   Gastrointestinal: Positive for abdominal pain. Negative for blood in stool, constipation and vomiting.   Genitourinary: Negative for dysuria and frequency.   Neurological: Positive for headaches.   All other systems reviewed and are negative.      Physical Exam     Patient Vitals for the past 24 hrs:   BP Temp Pulse Resp SpO2 Weight   05/08/19 0613 109/70 -- 80 18 97 % --   05/08/19 0212 118/69 98.2  F (36.8  C) 85 18 98 % 70.3 kg (155 lb)         Physical Exam  General:              Well-nourished              Speaking in full sentences  Eyes:              Conjunctiva without injection or scleral icterus  ENT:              Moist mucous membranes              Nares patent              Pinnae normal  Neck:              Full ROM              No stiffness appreciated  Resp:              Lungs CTAB              No crackles, wheezing or audible rubs              Good air movement  CV:                    Normal rate, regular rhythm              S1 and S2 present              No murmur, gallop or rub  GI:              BS present              Abdomen soft without distention              Tenderness to palpation to upper abdomen                No palpable mass              No guarding or rebound tenderness  Skin:              Warm, dry, well perfused              No rashes or open wounds on exposed skin  MSK:              Moves  all extremities              No focal deformities or swelling  Neuro:              Alert              Answers questions appropriately              Moves all extremities equally              Gait stable  Psych:              Normal affect, normal mood      Emergency Department Course     ECG:  ECG taken at 0435, ECG read at 0440  No significant change compared to EKG dated 3/6/10  Normal sinus rhythm  Left axis deviation  Low voltage QRS  Abnormal ECG  Rate 71 bpm. NJ interval 136 ms. QRS duration 84 ms. QT/QTc 424/460 ms. P-R-T axes 60 -31 48.    Imaging:  Radiology findings were communicated with the patient who voiced understanding of the findings.    Abdomen US, limited (RUQ only)  Cholelithiasis. There is no biliary dilatation or evidence  of cholecystitis.  CAROLE PAREDES MD  Reading per radiology    Laboratory:  Laboratory findings were communicated with the patient who voiced understanding of the findings.    UA with microscopic: urineketon 40 (A), pH 8.5 (H), protein albumin 200(A), leukocyte esterase moderate(A), WBC 8(H), mucous present (A) o/w WNL  CBC: WBC 7.2, HGB 14.6,   CMP: carbon dioxide 34 (H), glucose 104 (H), calcium 8.2 (L), ALT 64 (H),  (H) o/w WNL (Creatinine 0.72)  Lipase: 131  Troponin (Collected 0236): <0.015    Interventions:  0509 Toradol 15 mg IV    Emergency Department Course:  0236 IV was inserted and blood was drawn for laboratory testing, results above.    0343 Nursing notes and vitals reviewed.    0351 I performed an exam of the patient as documented above.     0415 The patient was sent for an ultrasound while in the emergency department, results above.     0450 The patient provided a urine sample here in the emergency department. This was sent for laboratory testing, findings above.    0504 Patient rechecked and updated.     0547 Patient rechecked and updated.     0630 I personally reviewed the laboratory and imaging results with the patient and answered all related  questions prior to discharge.    Impression & Plan      Medical Decision Making:  Shira Li is a 58 year old female who presented with abdominal pain.  VS on presentation reveal no acute abnormalities.  Differential diagnosis includes, though is not limited to, biliary colic, cholecystitis, choledocholithiasis, ascending cholangitis, pancreatitis, gastritis, peptic ulcer disease, nephrolithiasis, pyelonephritis, pulmonary embolism, pneumonia, cardiac ischemia, pneumothorax, among others.  History, examination, and ED work-up is most consistent with biliary colic.  Ultrasound has confirmed the presence of gallstones.  There is no clinical, laboratory, or ultrasound evidence of cholecystitis, choledocholithiasis, gallstone pancreatitis, or ascending cholangitis.  AST/ALT are mildly elevated as noted above, though Total bilirubin, alk phos, and lipase are normal, which argue against an obstructive stone.  There is no chest pain or ACS equivalent symptom to suggest the patient s symptoms are cardiac in nature.  EKG unremarkable and troponin is negative. There is no lower abdominal tenderness to suggest appendicitis, diverticulitis, or other acute process. UA with moderate LE and 8 WBC, though patient denies any urinary symptoms and will await results of UC prior to initiation of antibiotics.  On recheck, she is feeling significantly better. On re-examination, abdominal exam is reassuring, pain is controlled, and she is tolerating POs. I recommended avoiding fatty foods, and to return to the ED immediately for uncontrolled pain, vomiting, fever, or any other concerning symptoms. I discussed the natural history of symptomatic cholelithiasis, and I recommended outpatient follow up with general surgery in 3-5 days for further consultation and care.  Patient and  felt comfortable with this plan of care and questions were answered prior to DC.        Diagnosis:    ICD-10-CM    1. Biliary colic K80.50 UA  with Microscopic     Urine Culture     CANCELED: Urine Culture     Disposition:   The patient is discharged to home.    Discharge Medications:  The patient was discharged without medications.      Scribe Disclosure:  I, Forrest Robles, am serving as a scribe at 3:49 AM on 5/8/2019 to document services personally performed by Raj Hunt MD based on my observations and the provider's statements to me.    [facility] EMERGENCY DEPARTMENT       Raj Hunt MD  05/08/19 5699

## 2019-05-08 NOTE — ED AVS SNAPSHOT
[facility] Emergency Department  [address]  [facility] [address]  Phone:  [phone]  Fax:  [phone]                                    Shira Li   MRN: [id number]    Department:  [facility] Emergency Department   Date of Visit:  5/8/2019           After Visit Summary Signature Page    I have received my discharge instructions, and my questions have been answered. I have discussed any challenges I see with this plan with the nurse or doctor.    ..........................................................................................................................................  Patient/Patient Representative Signature      ..........................................................................................................................................  Patient Representative Print Name and Relationship to Patient    ..................................................               ................................................  Date                                   Time    ..........................................................................................................................................  Reviewed by Signature/Title    ...................................................              ..............................................  Date                                               Time          22EPIC Rev 08/18

## 2019-05-09 LAB
BACTERIA SPEC CULT: NORMAL
Lab: NORMAL
SPECIMEN SOURCE: NORMAL

## 2019-05-09 NOTE — RESULT ENCOUNTER NOTE
Final urine culture report is NEGATIVE per [facility] Lab Result protocol.    If NEGATIVE result, no change in treatment, per [facility] Lab Result protocol.

## 2019-05-14 ENCOUNTER — TELEPHONE (OUTPATIENT)
Dept: INTERNAL MEDICINE | Facility: CLINIC | Age: 59
End: 2019-05-14

## 2019-05-14 ENCOUNTER — NURSE TRIAGE (OUTPATIENT)
Dept: NURSING | Facility: CLINIC | Age: 59
End: 2019-05-14

## 2019-05-14 NOTE — TELEPHONE ENCOUNTER
Pt would like a nurse to give her a call back asap regarding labs done at the hospital on 05/08. PCP used to be Papa.    PHONE - [phone]    DETAIL MSG - YES

## 2019-05-14 NOTE — TELEPHONE ENCOUNTER
Shira is calling and states that [facility] of [facility]  and Shira is calling today to get results.  FNA advised to contact primary MD and Shira agreed.

## 2019-05-14 NOTE — TELEPHONE ENCOUNTER
Called patient who was interested in UC results from ER visit 5/8/19.  Reviewed results with patient.  She has scheduled an appt with surgeon to review case.  SARAH Hunter R.N.

## 2019-06-03 ENCOUNTER — OFFICE VISIT (OUTPATIENT)
Dept: SURGERY | Facility: CLINIC | Age: 59
End: 2019-06-03
Payer: COMMERCIAL

## 2019-06-03 VITALS
HEART RATE: 72 BPM | DIASTOLIC BLOOD PRESSURE: 64 MMHG | HEIGHT: 67 IN | RESPIRATION RATE: 16 BRPM | OXYGEN SATURATION: 100 % | WEIGHT: 147 LBS | BODY MASS INDEX: 23.07 KG/M2 | SYSTOLIC BLOOD PRESSURE: 110 MMHG

## 2019-06-03 DIAGNOSIS — K80.20 GALLSTONES: Primary | ICD-10-CM

## 2019-06-03 PROCEDURE — 99203 OFFICE O/P NEW LOW 30 MIN: CPT | Performed by: SURGERY

## 2019-06-03 ASSESSMENT — MIFFLIN-ST. JEOR: SCORE: 1274.42

## 2019-06-03 NOTE — PROGRESS NOTES
Chief complaint:  Abdominal pain, epigastric    HPI:  This patient is a 58 year old female who presents with episodic epigastric pain.  The patient describes that this pain occurs when she eats something that is distinctly off of her regular diet.  Recently she ate pizza.  She normally eats a gluten-free and dairy free diet.  Patient describes fairly severe pain up underneath her breastbone.  She presented to the emergency room, and her ultrasound showed gallstones.  The patient describes that her symptoms go away when she drinks a large amount of club soda with sodium bicarbonate.  She says that the relief is almost instantaneous when she burps vigorously after taking this concoction.  She has not tried Prilosec or Zantac.  There is a family history of gallbladder issues, though the patient indicates that those family members were quite obese.    Past Medical History:   has a past medical history of Broken arm, CARDIOVASCULAR SCREENING; LDL GOAL LESS THAN 130, MUNOZ (dyspnea on exertion), Fibula fracture, and Hollenhorst plaque, left eye.    Past Surgical History:  Past Surgical History:   Procedure Laterality Date     HEART CATH LEFT HEART CATH  03-06-15    Angiographic normal coronary arteries      ORTHOPEDIC SURGERY  1967    leg fracture         Social History:  Social History     Socioeconomic History     Marital status:      Spouse name: Not on file     Number of children: Not on file     Years of education: Not on file     Highest education level: Not on file   Occupational History     Not on file   Social Needs     Financial resource strain: Not on file     Food insecurity:     Worry: Not on file     Inability: Not on file     Transportation needs:     Medical: Not on file     Non-medical: Not on file   Tobacco Use     Smoking status: Never Smoker     Smokeless tobacco: Never Used   Substance and Sexual Activity     Alcohol use: Yes     Comment: 1 drink per month     Drug use: No     Sexual activity:  Yes     Partners: Male   Lifestyle     Physical activity:     Days per week: Not on file     Minutes per session: Not on file     Stress: Not on file   Relationships     Social connections:     Talks on phone: Not on file     Gets together: Not on file     Attends Hinduism service: Not on file     Active member of club or organization: Not on file     Attends meetings of clubs or organizations: Not on file     Relationship status: Not on file     Intimate partner violence:     Fear of current or ex partner: Not on file     Emotionally abused: Not on file     Physically abused: Not on file     Forced sexual activity: Not on file   Other Topics Concern     Parent/sibling w/ CABG, MI or angioplasty before 65F 55M? 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     High incident of childhood leukemia and cancers in this area.    [facility].                     Family History:  Family History   Problem Relation Age of Onset     Diabetes Mother          at age 30 yrs     C.A.D. Father 48        stents and CABG     Lipids Father      Coronary Artery Disease Father         MI     Allergy (Severe) Father         very allergic to medication     Heart Disease Other         multiple family members wtih valve replacement      Obesity Other         mult females with morbid obesity.     Cerebrovascular Disease Maternal Grandmother      Myocardial Infarction Maternal Grandmother          multiple MI's      Arthritis Maternal Grandmother         RA     Hypertension Maternal Grandmother      Psychotic Disorder Son         Madelyn      Coronary Artery Disease Maternal Grandfather         MI     Coronary Artery Disease Paternal Grandmother      Coronary Artery Disease Paternal Grandfather      Coronary Artery Disease Brother        Review of Systems:  The 10 point Review of Systems is negative other than noted in the HPI and above.    Physical Exam:  General - This is a well developed, well nourished female in no apparent distress.  HEENT - Normocephalic, atraumatic.  No scleral icterus.  Neck - supple without masses  Lungs - clear to ascultation.    Heart - Regular rate & rhythm without murmur  Abdomen:   soft, non-distended and nontender.    Extremities - warm without edema  Neurologic - nonfocal    Relevant labs:  Minimal elevation of the ALT and AST with total bilirubin of 0.9 and alkaline phosphatase of 111.    Imaging:  Ultrasound shows gallstones without evidence of wall thickening or ductal dilatation.    Assessment and Plan:  This is a patient with epigastric pain which seems to get better after taking antacids.  I do not think it is clear that her gallbladder is causing her symptoms, though it is certainly possible.  The patient has not had any symptoms since she has been back on what she feels is her normal diet.  I think would be reasonable for her to try to control her symptoms with diet and try antacids for her symptoms.  If she has another bad attack, I have encouraged her to go back to the emergency room.  If she has any focal issues around her gallbladder or ultrasound findings of inflammation, I think it would be a clear indication for cholecystectomy.  The patient is welcome to return to see me at any time.      Baljinder Kothari MD  [facility]    Please route or send letter to:  Primary Care Provider (PCP)

## 2019-06-03 NOTE — LETTER
Corinna [address], [id number]    RE: [facility], : 1960      Chief complaint:  Abdominal pain, epigastric     HPI:  This patient is a 58 year old female who presents with episodic epigastric pain.  The patient describes that this pain occurs when she eats something that is distinctly off of her regular diet.  Recently she ate pizza.  She normally eats a gluten-free and dairy free diet.  Patient describes fairly severe pain up underneath her breastbone.  She presented to the emergency room, and her ultrasound showed gallstones.  The patient describes that her symptoms go away when she drinks a large amount of club soda with sodium bicarbonate.  She says that the relief is almost instantaneous when she burps vigorously after taking this concoction.  She has not tried Prilosec or Zantac.  There is a family history of gallbladder issues, though the patient indicates that those family members were quite obese.     Past Medical History:  Has a past medical history of Broken arm, CARDIOVASCULAR SCREENING; LDL GOAL LESS THAN 130, MUNOZ (dyspnea on exertion), Fibula fracture, and Hollenhorst plaque, left eye.     Review of Systems:  The 10 point Review of Systems is negative other than noted in the HPI and above.     Physical Exam:  General - This is a well developed, well nourished female in no apparent distress.  HEENT - Normocephalic, atraumatic.  No scleral icterus.  Neck - supple without masses  Lungs - clear to ascultation.    Heart - Regular rate & rhythm without murmur  Abdomen:              soft, non-distended and nontender.    Extremities - warm without edema  Neurologic - nonfocal     Relevant labs:  Minimal elevation of the ALT and AST with total bilirubin of 0.9 and alkaline phosphatase of 111.     Imaging:  Ultrasound shows gallstones without evidence of wall thickening or ductal dilatation.     Assessment and Plan:  This is a patient with epigastric pain which seems to get better after taking antacids.  I do  not think it is clear that her gallbladder is causing her symptoms, though it is certainly possible.  The patient has not had any symptoms since she has been back on what she feels is her normal diet.  I think would be reasonable for her to try to control her symptoms with diet and try antacids for her symptoms.  If she has another bad attack, I have encouraged her to go back to the emergency room.  If she has any focal issues around her gallbladder or ultrasound findings of inflammation, I think it would be a clear indication for cholecystectomy.  The patient is welcome to return to see me at any time.        Baljinder Kothari MD  [facility]

## 2019-06-20 ENCOUNTER — OFFICE VISIT (OUTPATIENT)
Dept: URGENT CARE | Facility: URGENT CARE | Age: 59
End: 2019-06-20
Payer: COMMERCIAL

## 2019-06-20 ENCOUNTER — NURSE TRIAGE (OUTPATIENT)
Dept: NURSING | Facility: CLINIC | Age: 59
End: 2019-06-20

## 2019-06-20 VITALS
HEIGHT: 67 IN | OXYGEN SATURATION: 95 % | BODY MASS INDEX: 23.07 KG/M2 | TEMPERATURE: 98.6 F | WEIGHT: 147 LBS | DIASTOLIC BLOOD PRESSURE: 64 MMHG | SYSTOLIC BLOOD PRESSURE: 119 MMHG | HEART RATE: 68 BPM

## 2019-06-20 DIAGNOSIS — R10.13 ABDOMINAL PAIN, ACUTE, EPIGASTRIC: Primary | ICD-10-CM

## 2019-06-20 DIAGNOSIS — R74.8 ELEVATED AMYLASE AND LIPASE: ICD-10-CM

## 2019-06-20 DIAGNOSIS — K80.50 COMMON BILE DUCT STONE: ICD-10-CM

## 2019-06-20 DIAGNOSIS — K80.20 GALL STONES: ICD-10-CM

## 2019-06-20 DIAGNOSIS — R74.8 ELEVATED LIVER ENZYMES: ICD-10-CM

## 2019-06-20 DIAGNOSIS — R11.0 NAUSEA: ICD-10-CM

## 2019-06-20 DIAGNOSIS — R10.13 ABDOMINAL PAIN, ACUTE, EPIGASTRIC: ICD-10-CM

## 2019-06-20 LAB
ALBUMIN SERPL-MCNC: 3.8 G/DL (ref 3.4–5)
ALP SERPL-CCNC: 111 U/L (ref 40–150)
ALT SERPL W P-5'-P-CCNC: 541 U/L (ref 0–50)
AMYLASE SERPL-CCNC: 231 U/L (ref 30–110)
ANION GAP SERPL CALCULATED.3IONS-SCNC: 12 MMOL/L (ref 3–14)
AST SERPL W P-5'-P-CCNC: 859 U/L (ref 0–45)
BILIRUB SERPL-MCNC: 2 MG/DL (ref 0.2–1.3)
BUN SERPL-MCNC: 7 MG/DL (ref 7–30)
CALCIUM SERPL-MCNC: 9.5 MG/DL (ref 8.5–10.1)
CHLORIDE SERPL-SCNC: 101 MMOL/L (ref 94–109)
CO2 SERPL-SCNC: 32 MMOL/L (ref 20–32)
CREAT SERPL-MCNC: 0.8 MG/DL (ref 0.52–1.04)
ERYTHROCYTE [DISTWIDTH] IN BLOOD BY AUTOMATED COUNT: 12.6 % (ref 10–15)
GFR SERPL CREATININE-BSD FRML MDRD: 79 ML/MIN/{1.73_M2}
GLUCOSE SERPL-MCNC: 109 MG/DL (ref 70–99)
HCT VFR BLD AUTO: 42.8 % (ref 35–47)
HGB BLD-MCNC: 14.5 G/DL (ref 11.7–15.7)
LIPASE SERPL-CCNC: 4320 U/L (ref 73–393)
MCH RBC QN AUTO: 31 PG (ref 26.5–33)
MCHC RBC AUTO-ENTMCNC: 33.9 G/DL (ref 31.5–36.5)
MCV RBC AUTO: 92 FL (ref 78–100)
PLATELET # BLD AUTO: 172 10E9/L (ref 150–450)
POTASSIUM SERPL-SCNC: 4 MMOL/L (ref 3.4–5.3)
PROT SERPL-MCNC: 7.5 G/DL (ref 6.8–8.8)
RBC # BLD AUTO: 4.67 10E12/L (ref 3.8–5.2)
SODIUM SERPL-SCNC: 145 MMOL/L (ref 133–144)
WBC # BLD AUTO: 6.1 10E9/L (ref 4–11)

## 2019-06-20 PROCEDURE — 80053 COMPREHEN METABOLIC PANEL: CPT | Performed by: FAMILY MEDICINE

## 2019-06-20 PROCEDURE — 36415 COLL VENOUS BLD VENIPUNCTURE: CPT | Performed by: FAMILY MEDICINE

## 2019-06-20 PROCEDURE — 87338 HPYLORI STOOL AG IA: CPT | Performed by: FAMILY MEDICINE

## 2019-06-20 PROCEDURE — 82150 ASSAY OF AMYLASE: CPT | Performed by: FAMILY MEDICINE

## 2019-06-20 PROCEDURE — 99215 OFFICE O/P EST HI 40 MIN: CPT | Performed by: FAMILY MEDICINE

## 2019-06-20 PROCEDURE — 85027 COMPLETE CBC AUTOMATED: CPT | Performed by: FAMILY MEDICINE

## 2019-06-20 PROCEDURE — 83690 ASSAY OF LIPASE: CPT | Performed by: FAMILY MEDICINE

## 2019-06-20 RX ORDER — PANTOPRAZOLE SODIUM 40 MG/1
40 TABLET, DELAYED RELEASE ORAL DAILY
Qty: 60 TABLET | Refills: 1 | Status: SHIPPED | OUTPATIENT
Start: 2019-06-20 | End: 2019-09-06

## 2019-06-20 RX ORDER — SUCRALFATE ORAL 1 G/10ML
1 SUSPENSION ORAL 2 TIMES DAILY
Qty: 140 ML | Refills: 0 | Status: SHIPPED | OUTPATIENT
Start: 2019-06-20 | End: 2019-09-17

## 2019-06-20 ASSESSMENT — MIFFLIN-ST. JEOR: SCORE: 1274.42

## 2019-06-20 ASSESSMENT — PAIN SCALES - GENERAL: PAINLEVEL: SEVERE PAIN (7)

## 2019-06-20 NOTE — PROGRESS NOTES
SUBJECTIVE:   [facility] 58 [facility] with history of gallstones diagnosed 6 week back  presenting with a chief complaint of acute epigastric pain along with lot of burping .  She denies any change in her bowel habits has no nausea or throwing up.  She thinks is related to her overeating she had a lot of chips with ham yesterday and following that started noticing acute abdominal pain which is most related in the epigastric area.  She has been doing well for distance and also tried Zantac with some improvement of the symptoms but symptoms did not resolve completely.  She is currently rating her pain 7 out of 10.  Patient denies any chest pressure chest tightness or chest heaviness or even any chest pain at all.  Or any shortness of breath. She is an established patient of [facility].  Onset of symptoms was 12 hour(s) ago.Course of illness is worsening.    Severity moderateCurrent and Associated symptoms: abd pain in epigastric area   Treatment measures tried include antacid and zantac.  Predisposing factors include eating lots of ham and cheese   She had similar episodes of pain 2 months back and resolved with antacids.  Her pain did get better 6 out of 10 but she has been feeling nauseous    Past Medical History:   Diagnosis Date     Broken arm     as a child      CARDIOVASCULAR SCREENING; LDL GOAL LESS THAN 130      MUNOZ (dyspnea on exertion)      Fibula fracture     as a child      Hollenhorst plaque, left eye      Current Outpatient Medications   Medication Sig Dispense Refill     albuterol (PROAIR HFA/PROVENTIL HFA/VENTOLIN HFA) 108 (90 Base) MCG/ACT Inhaler Inhale 2 puffs into the lungs every 6 hours as needed for shortness of breath / dyspnea or wheezing 1 Inhaler 1     Calcium Carb-Cholecalciferol (CALCIUM 1000 + D) 1000-800 MG-UNIT TABS        fluconazole (DIFLUCAN) 100 MG tablet Take 100mg daily for 3 days (extra tablets given) 15 tablet 0     ibuprofen (ADVIL) 200 MG tablet Take 400  "mg by mouth every 6 hours as needed for mild pain       Multiple vitamin  s TABS  30 tablet      pantoprazole (PROTONIX) 40 MG EC tablet Take 1 tablet (40 mg) by mouth daily 60 tablet 1     sucralfate (CARAFATE) 1 GM/10ML suspension Take 10 mLs (1 g) by mouth 2 times daily for 7 days 140 mL 0     Social History     Tobacco Use     Smoking status: Never Smoker     Smokeless tobacco: Never Used   Substance Use Topics     Alcohol use: Yes     Comment: 1 drink per month     Family History   Problem Relation Age of Onset     Diabetes Mother          at age 30 yrs     C.A.D. Father 48        stents and CABG     Lipids Father      Coronary Artery Disease Father         MI     Allergy (Severe) Father         very allergic to medication     Heart Disease Other         multiple family members wtih valve replacement      Obesity Other         mult females with morbid obesity.     Cerebrovascular Disease Maternal Grandmother      Myocardial Infarction Maternal Grandmother         multiple MI's      Arthritis Maternal Grandmother         RA     Hypertension Maternal Grandmother      Psychotic Disorder Son         [place]      Coronary Artery Disease Maternal Grandfather         MI     Coronary Artery Disease Paternal Grandmother      Coronary Artery Disease Paternal Grandfather      Coronary Artery Disease Brother          ROS:    10 point ROS of systems including Constitutional, Eyes, Respiratory, Cardiovascular, Genitourinary, Integumentary, Muscularskeletal, Psychiatric were all negative except for pertinent positives noted in my HPI         OBJECTIVE:  /64   Pulse 68   Temp 98.6  F (37  C) (Tympanic)   Ht 1.702 m (5' 7\")   Wt 66.7 kg (147 lb)   LMP 2012   SpO2 95%   BMI 23.02 kg/m    GENERAL APPEARANCE: healthy, alert and no distress  EYES: EOMI,  PERRL, conjunctiva clear  HENT: ear canals and TM's normal.  Nose and mouth without ulcers, erythema or lesions  NECK: supple, nontender, no " lymphadenopathy  RESP: lungs clear to auscultation - no rales, rhonchi or wheezes  CV: regular rates and rhythm, normal S1 S2, no murmur noted  ABDOMEN:  Soft,tender epigastric area , no HSM or masses and bowel sounds normal  SKIN: no suspicious lesions or rashes  Physical Exam    Results for orders placed or performed in visit on 06/20/19   Comprehensive metabolic panel   Result Value Ref Range    Sodium 145 (H) 133 - 144 mmol/L    Potassium 4.0 3.4 - 5.3 mmol/L    Chloride 101 94 - 109 mmol/L    Carbon Dioxide 32 20 - 32 mmol/L    Anion Gap 12 3 - 14 mmol/L    Glucose 109 (H) 70 - 99 mg/dL    Urea Nitrogen 7 7 - 30 mg/dL    Creatinine 0.80 0.52 - 1.04 mg/dL    GFR Estimate 79 >60 mL/min/[1.73_m2]    GFR Estimate If Black 92 >60 mL/min/[1.73_m2]    Calcium 9.5 8.5 - 10.1 mg/dL    Bilirubin Total 2.0 (H) 0.2 - 1.3 mg/dL    Albumin 3.8 3.4 - 5.0 g/dL    Protein Total 7.5 6.8 - 8.8 g/dL    Alkaline Phosphatase 111 40 - 150 U/L     (HH) 0 - 50 U/L     (HH) 0 - 45 U/L   CBC with platelets   Result Value Ref Range    WBC 6.1 4.0 - 11.0 10e9/L    RBC Count 4.67 3.8 - 5.2 10e12/L    Hemoglobin 14.5 11.7 - 15.7 g/dL    Hematocrit 42.8 35.0 - 47.0 %    MCV 92 78 - 100 fl    MCH 31.0 26.5 - 33.0 pg    MCHC 33.9 31.5 - 36.5 g/dL    RDW 12.6 10.0 - 15.0 %    Platelet Count 172 150 - 450 10e9/L   Lipase   Result Value Ref Range    Lipase 4,320 (H) 73 - 393 U/L   Amylase   Result Value Ref Range    Amylase 231 (H) 30 - 110 U/L       X-Ray was not done.    Medical Decision Making:    Differential Diagnosis:  Abdominal Pain: GB/Cholelithiasis, Pancreatitis , CBD stone and gastritis ,peptic ulcer       ASSESSMENT:  Shira was seen today for urgent care and abdominal pain.    Diagnoses and all orders for this visit:    Abdominal pain, acute, epigastric  -     lidocaine HCl (XYLOCAINE) 2 % 15 mL, alum & mag hydroxide-simethicone (MYLANTA ES/MAALOX  ES) 15 mL GI Cocktail  -     Comprehensive metabolic panel  -     CBC  with platelets  -     Lipase  -     Amylase  -     pantoprazole (PROTONIX) 40 MG EC tablet; Take 1 tablet (40 mg) by mouth daily  -     GASTROENTEROLOGY ADULT REF PROCEDURE ONLY  -     sucralfate (CARAFATE) 1 GM/10ML suspension; Take 10 mLs (1 g) by mouth 2 times daily for 7 days  -     H Pylori antigen stool; Future  -     GASTROENTEROLOGY ADULT REF PROCEDURE ONLY Other; [facility] [phone]    Elevated amylase and lipase    Elevated liver enzymes    Common bile duct stone    Gall stones    Nausea          PLAN:  After patient was given the GI cocktail 15 minutes later her pain completely resolved.  She felt much better  Patient refused to go to the hub so some basic labs will be done while she is here discussed with patient that she will only be notified if the results were abnormal  We will also check a stool for H. pylori  Her LFTs were elevated did call patient and discussed with her about the elevated LFTs did call Dr. Kothari and discussed with patient's lab results  Patient was advised to follow-up tomorrow at the primary clinic and recheck her labs to see if the LFTs were going down.  I did review with patient with all the labs and advised that she also needs go to ER now due to critical lab values         did spent>45 minutes with patient and > 50% of the time was for answering questions, discussing findings, counseling and coordination of care     See orders in Epic    Calista Salmeron MD

## 2019-06-20 NOTE — TELEPHONE ENCOUNTER
"[facility] \"system down\", no e prescription received.  Verbal order provided to pharmacist as noted in Epic after UC visit today.        Reason for Disposition    Pharmacy calling with prescription question and triager answers question    Protocols used: MEDICATION QUESTION CALL-A-AH      "

## 2019-06-21 ENCOUNTER — DOCUMENTATION ONLY (OUTPATIENT)
Dept: LAB | Facility: CLINIC | Age: 59
End: 2019-06-21

## 2019-06-21 ENCOUNTER — TELEPHONE (OUTPATIENT)
Dept: URGENT CARE | Facility: URGENT CARE | Age: 59
End: 2019-06-21

## 2019-06-21 DIAGNOSIS — R74.8 ELEVATED LIVER ENZYMES: Primary | ICD-10-CM

## 2019-06-21 DIAGNOSIS — R74.8 ELEVATED LIVER ENZYMES: ICD-10-CM

## 2019-06-21 LAB
ALBUMIN SERPL-MCNC: 3.7 G/DL (ref 3.4–5)
ALP SERPL-CCNC: 148 U/L (ref 40–150)
ALT SERPL W P-5'-P-CCNC: 498 U/L (ref 0–50)
ANION GAP SERPL CALCULATED.3IONS-SCNC: 13 MMOL/L (ref 3–14)
AST SERPL W P-5'-P-CCNC: 317 U/L (ref 0–45)
BILIRUB SERPL-MCNC: 0.9 MG/DL (ref 0.2–1.3)
BUN SERPL-MCNC: 12 MG/DL (ref 7–30)
CALCIUM SERPL-MCNC: 8.9 MG/DL (ref 8.5–10.1)
CHLORIDE SERPL-SCNC: 106 MMOL/L (ref 94–109)
CO2 SERPL-SCNC: 22 MMOL/L (ref 20–32)
CREAT SERPL-MCNC: 0.86 MG/DL (ref 0.52–1.04)
GFR SERPL CREATININE-BSD FRML MDRD: 74 ML/MIN/{1.73_M2}
GLUCOSE SERPL-MCNC: 98 MG/DL (ref 70–99)
H PYLORI AG STL QL IA: NORMAL
POTASSIUM SERPL-SCNC: 3.4 MMOL/L (ref 3.4–5.3)
PROT SERPL-MCNC: 7 G/DL (ref 6.8–8.8)
SODIUM SERPL-SCNC: 141 MMOL/L (ref 133–144)
SPECIMEN SOURCE: NORMAL

## 2019-06-21 PROCEDURE — 80053 COMPREHEN METABOLIC PANEL: CPT | Performed by: NURSE PRACTITIONER

## 2019-06-21 PROCEDURE — 36415 COLL VENOUS BLD VENIPUNCTURE: CPT | Performed by: NURSE PRACTITIONER

## 2019-06-21 NOTE — TELEPHONE ENCOUNTER
Spoke to patient.  She has not gone to the ER and is having labs redrawn  through her primary doctor today.  She declined going to the ER AMA. She states  is aware of her abnormal labs.  She states she is feeling better and will follow up with her pcp.     Mya Kim CMA 6/21/2019 11:57 AM

## 2019-06-21 NOTE — TELEPHONE ENCOUNTER
SUBJECTIVE:  The June 20, 2019, H Pylori test was negative for the presence of the bacteria H Pylori.      PLAN:   Please notify patient of this normal result.      Patient should go to the emergency room today, since yesterday's labs showed both a hepatitis and pancreatitis and since she was having a lot of pain yesterday.  I don't see any record in the computer medical chart that she went to the ER.      Brennan Padgett MD

## 2019-09-06 ENCOUNTER — ANCILLARY PROCEDURE (OUTPATIENT)
Dept: GENERAL RADIOLOGY | Facility: CLINIC | Age: 59
End: 2019-09-06
Attending: FAMILY MEDICINE
Payer: COMMERCIAL

## 2019-09-06 ENCOUNTER — OFFICE VISIT (OUTPATIENT)
Dept: FAMILY MEDICINE | Facility: CLINIC | Age: 59
End: 2019-09-06
Payer: COMMERCIAL

## 2019-09-06 VITALS
DIASTOLIC BLOOD PRESSURE: 81 MMHG | WEIGHT: 142 LBS | HEIGHT: 67 IN | SYSTOLIC BLOOD PRESSURE: 123 MMHG | BODY MASS INDEX: 22.29 KG/M2 | HEART RATE: 81 BPM | TEMPERATURE: 98.8 F | RESPIRATION RATE: 14 BRPM

## 2019-09-06 DIAGNOSIS — M79.671 RIGHT FOOT PAIN: ICD-10-CM

## 2019-09-06 DIAGNOSIS — S92.534A CLOSED NONDISPLACED FRACTURE OF DISTAL PHALANX OF LESSER TOE OF RIGHT FOOT, INITIAL ENCOUNTER: Primary | ICD-10-CM

## 2019-09-06 PROCEDURE — 73660 X-RAY EXAM OF TOE(S): CPT | Mod: RT

## 2019-09-06 PROCEDURE — 73630 X-RAY EXAM OF FOOT: CPT | Mod: RT

## 2019-09-06 PROCEDURE — 99214 OFFICE O/P EST MOD 30 MIN: CPT | Performed by: FAMILY MEDICINE

## 2019-09-06 ASSESSMENT — MIFFLIN-ST. JEOR: SCORE: 1243.8

## 2019-09-06 NOTE — PROGRESS NOTES
Subjective     Shira Li is a 58 year old female who presents to clinic today for the following health issues:    HPI     Hit her right foot on the brass post of her bed frame 2 days ago.  Reports the second and third toes split on either side of the bedpost.  Since then, she has noticed swelling and bruising of the foot.      She has not attempted to bear weight on the foot for fear that she would make an injury worse.    She does not report any foot pain currently.    No previous trauma to the foot.      Patient Active Problem List   Diagnosis     Hollenhorst plaque, left eye     Advanced directives, counseling/discussion     Hypercholesteremia     MUNOZ (dyspnea on exertion)     Status post coronary angiogram     Past Surgical History:   Procedure Laterality Date     HEART CATH LEFT HEART CATH  03-06-15    Angiographic normal coronary arteries      ORTHOPEDIC SURGERY  1967    leg fracture        Social History     Tobacco Use     Smoking status: Never Smoker     Smokeless tobacco: Never Used   Substance Use Topics     Alcohol use: Yes     Comment: 1 drink per month     Family History   Problem Relation Age of Onset     Diabetes Mother          at age 30 yrs     C.A.D. Father 48        stents and CABG     Lipids Father      Coronary Artery Disease Father         MI     Allergy (Severe) Father         very allergic to medication     Heart Disease Other         multiple family members wtih valve replacement      Obesity Other         mult females with morbid obesity.     Cerebrovascular Disease Maternal Grandmother      Myocardial Infarction Maternal Grandmother         multiple MI's      Arthritis Maternal Grandmother         RA     Hypertension Maternal Grandmother      Psychotic Disorder Son         [place]      Coronary Artery Disease Maternal Grandfather         MI     Coronary Artery Disease Paternal Grandmother      Coronary Artery Disease Paternal Grandfather      Coronary Artery Disease  "Brother          Reviewed and updated as needed this visit by Provider  Tobacco  Allergies  Meds  Problems  Med Hx  Surg Hx  Fam Hx         Review of Systems   ROS COMP: Constitutional, HEENT, cardiovascular, pulmonary, gi and gu systems are negative, except as otherwise noted.      Objective    /81 (BP Location: Left arm, Patient Position: Sitting, Cuff Size: Adult Regular)   Pulse 81   Temp 98.8  F (37.1  C) (Oral)   Resp 14   Ht 1.689 m (5' 6.5\")   Wt 64.4 kg (142 lb)   LMP 05/18/2012   Breastfeeding? No   BMI 22.58 kg/m    Body mass index is 22.58 kg/m .  Physical Exam   GENERAL: healthy, alert and no distress  MS: right foot - bruising and swelling of the dorsal foot overlying the 2nd and 3rd distal MTP, ttp over the 3rd digit, bruising here as well    Diagnostic Test Results:    XR right foot and 3rd digit - Findings: There is an oblique nondisplaced fracture of the third toe  proximal phalanx diaphysis. No additional fractures. Normal joint  spacing and alignment. No soft tissue abnormal is evident.    Assessment & Plan      1. Closed nondisplaced fracture of distal phalanx of lesser toe of right foot, initial encounter - discussed diagnosis, advised buddy tape as able and walk with boot for next 4 weeks. RTC at that time for reimaging.   - order for DME; Equipment being ordered: short walking boot  Dispense: 1 Units; Refill: 0    2. Right foot pain  - XR Foot Right G/E 3 Views; Future  - XR Toe Right G/E 2 Views; Future      Return in about 1 month (around 10/6/2019) for if symptoms fail to improve or worsen.    Isabel Bauman MD  [facility]        "

## 2019-09-13 ENCOUNTER — OFFICE VISIT (OUTPATIENT)
Dept: SURGERY | Facility: CLINIC | Age: 59
End: 2019-09-13
Payer: COMMERCIAL

## 2019-09-13 ENCOUNTER — TELEPHONE (OUTPATIENT)
Dept: SURGERY | Facility: CLINIC | Age: 59
End: 2019-09-13

## 2019-09-13 ENCOUNTER — HOSPITAL ENCOUNTER (OUTPATIENT)
Facility: CLINIC | Age: 59
End: 2019-09-13
Attending: SURGERY | Admitting: SURGERY
Payer: COMMERCIAL

## 2019-09-13 VITALS
HEIGHT: 67 IN | RESPIRATION RATE: 16 BRPM | BODY MASS INDEX: 22.29 KG/M2 | WEIGHT: 142 LBS | SYSTOLIC BLOOD PRESSURE: 108 MMHG | DIASTOLIC BLOOD PRESSURE: 64 MMHG | OXYGEN SATURATION: 98 % | HEART RATE: 76 BPM

## 2019-09-13 DIAGNOSIS — K80.20 GALLSTONES: Primary | ICD-10-CM

## 2019-09-13 PROCEDURE — 99214 OFFICE O/P EST MOD 30 MIN: CPT | Performed by: SURGERY

## 2019-09-13 ASSESSMENT — MIFFLIN-ST. JEOR: SCORE: 1243.8

## 2019-09-13 NOTE — TELEPHONE ENCOUNTER
Type of surgery: Lap lisa with intraoperative cholangiogram  Location of surgery: [facility]  Date and time of surgery: 9/26/19 at 7:30am  Surgeon: Dr. Baljinder Kothari  Pre-Op Appt Date: Patient to schedule  Post-Op Appt Date: Patient to schedule   Packet sent out: Yes  Pre-cert/Authorization completed:  Not Applicable  Date: 9/13/19

## 2019-09-13 NOTE — LETTER
2019       Re: Shira Tejada [facility] - 1960    The patient returns today with continued upper abdominal symptoms.  She has been losing a fair amount of weight.  She did have some labs drawn in  which revealed elevated liver function tests and elevated lipase and amylase.  She continues to have daily symptoms if she eats anything other than berries or bagels.  The patient describes this is a pain that starts above the rib margin on the right and goes over to the left side.  She describes a gurgling sensation.  She did have some lab work done shortly after she last saw me.  This revealed some elevation of liver function tests.     Past medical and surgical histories are reviewed.     Physical exam:  The patient is in no apparent distress.  Breathing is nonlabored.  The abdomen is soft with mild tenderness in the right upper quadrant.     No additional imaging has been performed.  Gallstones were previously seen on ultrasound.     Lab work on 2019 revealed a total bilirubin of 2.0, and alkaline phosphatase of 111, and ALT of 541, and AST of 859, and amylase of 231 and a lipase of 4320.  Repeat labs the next day revealed a total bilirubin of 0.9, alkaline phosphatase of 148, ALT of 498 and AST of 317.     Assessment and plan: Patient continues to have her fairly vague upper abdominal symptoms.  Since her last visit, however, the patient did have elevation of both her liver function tests and her lipase. This is highly suggestive of having passed a common bile duct stone.  For this reason, I have suggested that we go ahead with laparoscopic cholecystectomy with intraoperative cholangiogram.  We discussed the procedure, along with its risks and complications, in detail.  The patient has agreed to proceed.  We did once again discussed that her symptoms are quite atypical, and it is certainly possible that some of her symptoms are unrelated to the gallbladder.  We will work on getting her  scheduled for surgery in the near future.          Baljinder Kothari MD  [facility]

## 2019-09-13 NOTE — PROGRESS NOTES
The patient returns today with continued upper abdominal symptoms.  She has been losing a fair amount of weight.  She did have some labs drawn in June which revealed elevated liver function tests and elevated lipase and amylase.  She continues to have daily symptoms if she eats anything other than berries or bagels.  The patient describes this is a pain that starts above the rib margin on the right and goes over to the left side.  She describes a gurgling sensation.  She did have some lab work done shortly after she last saw me.  This revealed some elevation of liver function tests.    Past medical and surgical histories are reviewed.    Physical exam:  The patient is in no apparent distress.  Breathing is nonlabored.  The abdomen is soft with mild tenderness in the right upper quadrant.    No additional imaging has been performed.  Gallstones were previously seen on ultrasound.    Lab work on 6/20/2019 revealed a total bilirubin of 2.0, and alkaline phosphatase of 111, and ALT of 541, and AST of 859, and amylase of 231 and a lipase of 4320.  Repeat labs the next day revealed a total bilirubin of 0.9, alkaline phosphatase of 148, ALT of 498 and AST of 317.    Assessment and plan: Patient continues to have her fairly vague upper abdominal symptoms.  Since her last visit, however, the patient did have elevation of both her liver function tests and her lipase.  This is highly suggestive of having passed a common bile duct stone.  For this reason, I have suggested that we go ahead with laparoscopic cholecystectomy with intraoperative cholangiogram.  We discussed the procedure, along with its risks and complications, in detail.  The patient has agreed to proceed.  We did once again discussed that her symptoms are quite atypical, and it is certainly possible that some of her symptoms are unrelated to the gallbladder.  We will work on getting her scheduled for surgery in the near future.    Total of 30 minutes was spent  with the patient today, greater than 50% of this in counseling and education.    Baljinder Kothari MD  [facility]    Please route or send letter to:  Primary Care Provider (PCP)

## 2019-09-17 ENCOUNTER — OFFICE VISIT (OUTPATIENT)
Dept: INTERNAL MEDICINE | Facility: CLINIC | Age: 59
End: 2019-09-17
Payer: COMMERCIAL

## 2019-09-17 VITALS
TEMPERATURE: 97.4 F | RESPIRATION RATE: 17 BRPM | SYSTOLIC BLOOD PRESSURE: 112 MMHG | WEIGHT: 138.8 LBS | BODY MASS INDEX: 21.79 KG/M2 | OXYGEN SATURATION: 99 % | HEIGHT: 67 IN | HEART RATE: 84 BPM | DIASTOLIC BLOOD PRESSURE: 64 MMHG

## 2019-09-17 DIAGNOSIS — Z01.818 PREOP GENERAL PHYSICAL EXAM: Primary | ICD-10-CM

## 2019-09-17 DIAGNOSIS — R74.8 ELEVATED LIVER ENZYMES: ICD-10-CM

## 2019-09-17 DIAGNOSIS — K80.20 GALLSTONES: ICD-10-CM

## 2019-09-17 LAB — HGB BLD-MCNC: 14.5 G/DL (ref 11.7–15.7)

## 2019-09-17 PROCEDURE — 93000 ELECTROCARDIOGRAM COMPLETE: CPT | Performed by: INTERNAL MEDICINE

## 2019-09-17 PROCEDURE — 85018 HEMOGLOBIN: CPT | Performed by: INTERNAL MEDICINE

## 2019-09-17 PROCEDURE — 99215 OFFICE O/P EST HI 40 MIN: CPT | Performed by: INTERNAL MEDICINE

## 2019-09-17 PROCEDURE — 36415 COLL VENOUS BLD VENIPUNCTURE: CPT | Performed by: INTERNAL MEDICINE

## 2019-09-17 PROCEDURE — 84132 ASSAY OF SERUM POTASSIUM: CPT | Performed by: INTERNAL MEDICINE

## 2019-09-17 PROCEDURE — 80076 HEPATIC FUNCTION PANEL: CPT | Performed by: INTERNAL MEDICINE

## 2019-09-17 ASSESSMENT — MIFFLIN-ST. JEOR: SCORE: 1229.28

## 2019-09-17 NOTE — PROGRESS NOTES
[place] [address]  [facility] [address]  [phone]  Dept: [phone]    PRE-OP EVALUATION:  Today's date: 2019    Shira Li (: 1960) presents for pre-operative evaluation assessment as requested by Dr. Kothari.  She requires evaluation and anesthesia risk assessment prior to undergoing surgery/procedure for treatment of cholelithiasis-  laparoscopioc CHOLECYSTECTOMY WITH CHOLANGIOGRAM    Fax number for surgical facility: [facility]  Primary Physician: Mya Pino  Type of Anesthesia Anticipated: General    Patient has a Health Care Directive or Living Will:  NO    Preop Questions 2019   Who is doing your surgery? dr churchill   What are you having done?  laparoscopioc CHOLECYSTECTOMY WITH CHOLANGIOGRAM   Date of Surgery/Procedure:    Facility or Hospital where procedure/surgery will be performed: [facility]   1.  Do you have a history of Heart attack, stroke, stent, coronary bypass surgery, or other heart surgery? No   2.  Do you ever have any pain or discomfort in your chest? NO   3.  Do you have a history of  Heart Failure? No   4.   Are you troubled by shortness of breath when:  walking on a level surface, or up a slight hill, or at night? YES     5.  Do you currently have a cold, bronchitis or other respiratory infection? No   6.  Do you have a cough, shortness of breath, or wheezing? No   7.  Do you sometimes get pains in the calves of your legs when you walk? No   8. Do you or anyone in your family have previous history of blood clots? YES - father    9.  Do you or does anyone in your family have a serious bleeding problem such as prolonged bleeding following surgeries or cuts? No   10. Have you ever had problems with anemia or been told to take iron pills? No   11. Have you had any abnormal blood loss such as black, tarry or bloody stools, or abnormal vaginal bleeding? No   12. Have you ever had a blood  transfusion? No   13. Have you or any of your relatives ever had problems with anesthesia? YES - versed is the only one works per pt    14. Do you have sleep apnea, excessive snoring or daytime drowsiness? No   15. Do you have any prosthetic heart valves? No   16. Do you have prosthetic joints? No   17. Is there any chance that you may be pregnant? No         HPI:      See problem list for active medical problems.  Problems all longstanding and stable, except as noted/documented.  See ROS for pertinent symptoms related to these conditions.    Had elevated liver enzymes in 05/19, 06/19 . US did show Cholelithiasis. There is no biliary dilatation or evidence  of cholecystitis.  Patient is questioning if she needs cholecystectomy at this time, patient has been evaluated by surgeon and was recommended to get a cholecystectomy.    MEDICAL HISTORY:     Patient Active Problem List    Diagnosis Date Noted     Hollenhorst plaque, left eye      Priority: High     Status post coronary angiogram 03/06/2015     Priority: Medium     MUNOZ (dyspnea on exertion)      Priority: Medium     Hypercholesteremia 02/11/2015     Priority: Medium     Advanced directives, counseling/discussion 02/06/2015     Priority: Medium     Patient states has Advance Directive and will bring in a copy to clinic.        Past Medical History:   Diagnosis Date     Broken arm     as a child      CARDIOVASCULAR SCREENING; LDL GOAL LESS THAN 130      MUNOZ (dyspnea on exertion)      Fibula fracture     as a child      Carlos Albertoorst plaque, left eye      Past Surgical History:   Procedure Laterality Date     HEART CATH LEFT HEART CATH  03-06-15    Angiographic normal coronary arteries      ORTHOPEDIC SURGERY  1967    leg fracture      Current Outpatient Medications   Medication Sig Dispense Refill     Calcium Carb-Cholecalciferol (CALCIUM 1000 + D) 1000-800 MG-UNIT TABS        Multiple vitamin  s TABS  30 tablet      order for DME Equipment being ordered: surgical  "shoe 1 Units 0     OTC products: None, except as noted above    Allergies   Allergen Reactions     Sulfa Drugs Other (See Comments)     Headaches.     Tylenol [Acetaminophen]      PST insufficency     1-Methyl 2-Pyrrolidone      Casein      PST Enzyme Deficiency     Gluten Meal      PST Enzyme Deficiency     Yellow Dyes      Horrible Headache      Latex Allergy: NO    Social History     Tobacco Use     Smoking status: Never Smoker     Smokeless tobacco: Never Used   Substance Use Topics     Alcohol use: Yes     Comment: 1 drink per month     History   Drug Use No       REVIEW OF SYSTEMS:   CONSTITUTIONAL: NEGATIVE for fever, chills, change in weight  INTEGUMENTARY/SKIN: NEGATIVE for worrisome rashes, moles or lesions  EYES: NEGATIVE for vision changes or irritation  ENT/MOUTH: NEGATIVE for ear, mouth and throat problems  RESP: NEGATIVE for significant cough or SOB  CV: NEGATIVE for chest pain, palpitations or peripheral edema  GI: NEGATIVE for nausea, abdominal pain, heartburn, or change in bowel habits  : NEGATIVE for frequency, dysuria, or hematuria  MUSCULOSKELETAL: NEGATIVE for significant arthralgias or myalgia  NEURO: NEGATIVE for weakness, dizziness or paresthesias  ENDOCRINE: NEGATIVE for temperature intolerance, skin/hair changes  HEME: NEGATIVE for bleeding problems  PSYCHIATRIC: NEGATIVE for changes in mood or affect    EXAM:   /64   Pulse 84   Temp 97.4  F (36.3  C) (Oral)   Resp 17   Ht 1.689 m (5' 6.5\")   Wt 63 kg (138 lb 12.8 oz)   LMP 05/18/2012   SpO2 99%   BMI 22.07 kg/m      GENERAL APPEARANCE: healthy, alert and no distress     EYES: EOMI, PERRL     HENT: ear canals and TM's normal and nose and mouth without ulcers or lesions     NECK: no adenopathy, no asymmetry, masses, or scars and thyroid normal to palpation     RESP: lungs clear to auscultation - no rales, rhonchi or wheezes     CV: regular rates and rhythm,       ABDOMEN:  soft, nontender, no HSM or masses and bowel sounds " normal     MS: extremities normal- no gross deformities noted, no evidence of inflammation in joints, FROM in all extremities.     NEURO: Normal strength and tone, sensory exam grossly normal, mentation intact and speech normal     PSYCH: mentation appears normal. and affect normal/bright      DIAGNOSTICS:   EKG: Sinus Rhythm , Lt  axis, normal intervals, no acute ST/T changes c/w ischemia,    Hemoglobin  Pending   Serum Potassium pending   LFT; pending     Recent Labs   Lab Test 06/21/19  1202 06/20/19  1008 05/08/19  0236  03/03/15  1114   HGB  --  14.5 14.6   < > 14.4   PLT  --  172 208   < > 171   INR  --   --   --   --  0.95    145* 142   < > 139   POTASSIUM 3.4 4.0 3.5   < > 4.2   CR 0.86 0.80 0.72   < > 0.69    < > = values in this interval not displayed.        IMPRESSION:     (Z01.818) Preop general physical exam  (primary encounter diagnosis)  Comment: Cholelithiasis  Plan: Hemoglobin, Potassium, EKG 12-lead complete         w/read - Clinics, CANCELED: EKG 12-lead         complete w/read - Clinics         (K80.20) Gallstones  Plan: laparoscopioc CHOLECYSTECTOMY WITH CHOLANGIOGRAM  Check Hepatic panel            (R74.8) Elevated liver enzymes  Plan: Hepatic panel             The proposed surgical procedure is considered INTERMEDIATE risk.    REVISED CARDIAC RISK INDEX  The patient has the following serious cardiovascular risks for perioperative complications such as (MI, PE, VFib and 3  AV Block):  No serious cardiac risks       The patient has the following additional risks for perioperative complications:  No identified additional risks      ICD-10-CM    1. Preop general physical exam Z01.818 Hemoglobin     Potassium     EKG 12-lead complete w/read - Clinics     CANCELED: EKG 12-lead complete w/read - Clinics   2. Gallstones K80.20 Hepatic panel   3. Elevated liver enzymes R74.8 Hepatic panel       RECOMMENDATIONS:     Anticoagulant or Antiplatelet Medication Use  ASPIRIN: Discontinue ASA 7-  days  prior to procedure to reduce bleeding risk.    NSAIDS:    Stop  3 days prior to surgery      Patient states she is still thinking about cholecystectomy surgery,she is not sure if she is going to proceed with it but would like to have the preop done at this time, patient is also requesting referral to see gastroenterologist.  Will review the hepatic panel results and will refer to gastroenterologist if she still has elevated liver enzymes.    APPROVAL GIVEN to proceed with proposed procedure, without further diagnostic evaluation       Signed Electronically by: Kraig Verde MD    Copy of this evaluation report is provided to requesting physician.    [facility] Preop Guidelines    Revised Cardiac Risk Index

## 2019-09-17 NOTE — LETTER
[facility]  [address], [address]  [place] [address]  [phone]        September 19, 2019    Shira Li  [id number] [facility] [address]            Dear Ms. Shira Li:      The results of your recent labs are normal.  If you have any further questions or problems, please contact our office.    Sincerely,        Clif Verde M.D.    Results for orders placed or performed in visit on 09/17/19   Hemoglobin   Result Value Ref Range    Hemoglobin 14.5 11.7 - 15.7 g/dL   Potassium   Result Value Ref Range    Potassium 4.4 3.4 - 5.3 mmol/L   Hepatic panel   Result Value Ref Range    Bilirubin Direct 0.2 0.0 - 0.2 mg/dL    Bilirubin Total 0.7 0.2 - 1.3 mg/dL    Albumin 3.9 3.4 - 5.0 g/dL    Protein Total 7.6 6.8 - 8.8 g/dL    Alkaline Phosphatase 114 40 - 150 U/L    ALT 49 0 - 50 U/L    AST 17 0 - 45 U/L

## 2019-09-17 NOTE — NURSING NOTE
"/64   Pulse 84   Temp 97.4  F (36.3  C) (Oral)   Resp 17   Ht 1.689 m (5' 6.5\")   Wt 63 kg (138 lb 12.8 oz)   LMP 05/18/2012   SpO2 99%   BMI 22.07 kg/m    Patient in for Pre-Op.  Ai Tapia CMA    "

## 2019-09-18 LAB
ALBUMIN SERPL-MCNC: 3.9 G/DL (ref 3.4–5)
ALP SERPL-CCNC: 114 U/L (ref 40–150)
ALT SERPL W P-5'-P-CCNC: 49 U/L (ref 0–50)
AST SERPL W P-5'-P-CCNC: 17 U/L (ref 0–45)
BILIRUB DIRECT SERPL-MCNC: 0.2 MG/DL (ref 0–0.2)
BILIRUB SERPL-MCNC: 0.7 MG/DL (ref 0.2–1.3)
POTASSIUM SERPL-SCNC: 4.4 MMOL/L (ref 3.4–5.3)
PROT SERPL-MCNC: 7.6 G/DL (ref 6.8–8.8)

## 2019-09-19 RX ORDER — CEFAZOLIN SODIUM 1 G/3ML
1 INJECTION, POWDER, FOR SOLUTION INTRAMUSCULAR; INTRAVENOUS SEE ADMIN INSTRUCTIONS
Status: CANCELLED | OUTPATIENT
Start: 2019-09-19

## 2019-09-19 RX ORDER — CEFAZOLIN SODIUM 2 G/100ML
2 INJECTION, SOLUTION INTRAVENOUS
Status: CANCELLED | OUTPATIENT
Start: 2019-09-19

## 2019-09-19 NOTE — OR NURSING
"Pt called from [facility].  Pt states she would like to \"post-pone\" surgery due to lab results received today being WNL.  Dr. Kothari's clinic notified, VM left with Natali  "

## 2019-11-06 ENCOUNTER — ANCILLARY PROCEDURE (OUTPATIENT)
Dept: GENERAL RADIOLOGY | Facility: CLINIC | Age: 59
End: 2019-11-06
Attending: FAMILY MEDICINE
Payer: COMMERCIAL

## 2019-11-06 ENCOUNTER — OFFICE VISIT (OUTPATIENT)
Dept: FAMILY MEDICINE | Facility: CLINIC | Age: 59
End: 2019-11-06
Payer: COMMERCIAL

## 2019-11-06 VITALS
OXYGEN SATURATION: 98 % | TEMPERATURE: 98.6 F | WEIGHT: 141 LBS | BODY MASS INDEX: 22.42 KG/M2 | DIASTOLIC BLOOD PRESSURE: 64 MMHG | SYSTOLIC BLOOD PRESSURE: 112 MMHG | HEART RATE: 81 BPM

## 2019-11-06 DIAGNOSIS — S92.514D CLOSED NONDISPLACED FRACTURE OF PROXIMAL PHALANX OF LESSER TOE OF RIGHT FOOT WITH ROUTINE HEALING, SUBSEQUENT ENCOUNTER: ICD-10-CM

## 2019-11-06 DIAGNOSIS — S92.514D CLOSED NONDISPLACED FRACTURE OF PROXIMAL PHALANX OF LESSER TOE OF RIGHT FOOT WITH ROUTINE HEALING, SUBSEQUENT ENCOUNTER: Primary | ICD-10-CM

## 2019-11-06 PROCEDURE — 99214 OFFICE O/P EST MOD 30 MIN: CPT | Performed by: FAMILY MEDICINE

## 2019-11-06 PROCEDURE — 73660 X-RAY EXAM OF TOE(S): CPT | Mod: RT

## 2019-11-06 NOTE — PROGRESS NOTES
"Subjective     Shiramendez Li is a 59 year old female who presents to clinic today for the following health issues:    HPI   Concern - broken toe  Onset: follow up      Progression of Symptoms:  Worsening - hitting it when pressing on it-\"aches\"      I dosed with a nondisplaced oblique fracture of the third proximal phalanx in the beginning of September.  She has been wearing her surgical shoe and taking it easy since then.  She does have occasional instances where she will have some pain with firm pressure on the toes, otherwise symptoms are improving.      Patient Active Problem List   Diagnosis     Hollenhorst plaque, left eye     Advanced directives, counseling/discussion     Hypercholesteremia     MUNOZ (dyspnea on exertion)     Status post coronary angiogram     Past Surgical History:   Procedure Laterality Date     HEART CATH LEFT HEART CATH  03-06-15    Angiographic normal coronary arteries      ORTHOPEDIC SURGERY  1967    leg fracture        Social History     Tobacco Use     Smoking status: Never Smoker     Smokeless tobacco: Never Used   Substance Use Topics     Alcohol use: Yes     Comment: 1 drink per month     Family History   Problem Relation Age of Onset     Diabetes Mother          at age 30 yrs     C.A.D. Father 48        stents and CABG     Lipids Father      Coronary Artery Disease Father         MI     Allergy (Severe) Father         very allergic to medication     Heart Disease Other         multiple family members [facility] valve replacement      Obesity Other         mult females with morbid obesity.     Cerebrovascular Disease Maternal Grandmother      Myocardial Infarction Maternal Grandmother         multiple MI's      Arthritis Maternal Grandmother         RA     Hypertension Maternal Grandmother      Psychotic Disorder Son         [place]      Coronary Artery Disease Maternal Grandfather         MI     Coronary Artery Disease Paternal Grandmother      Coronary Artery Disease " Paternal Grandfather      Coronary Artery Disease Brother            Reviewed and updated as needed this visit by Provider  Tobacco  Allergies  Meds  Problems  Med Hx  Surg Hx  Fam Hx         Review of Systems   ROS COMP: Constitutional, HEENT, cardiovascular, pulmonary, gi and gu systems are negative, except as otherwise noted.      Objective    /64 (BP Location: Right arm, Patient Position: Sitting, Cuff Size: Adult Regular)   Pulse 81   Temp 98.6  F (37  C) (Oral)   Wt 64 kg (141 lb)   LMP 05/18/2012   SpO2 98%   BMI 22.42 kg/m    Body mass index is 22.42 kg/m .  Physical Exam   GENERAL: healthy, alert and no distress  MS: Tender to palpation overlying the proximal phalanx, ventral side.    Diagnostic Test Results:  Right 3rd toe XR   IMPRESSION: The obliquely oriented fracture proximal phalanx third toe  is now more difficult to identify than on the 9/6/2019 x-ray  indicating some significant degree of interval healing without  significant change in position or alignment. The fracture is likely  nearly completely healed if not healed.        Assessment & Plan     1. Closed nondisplaced fracture of proximal phalanx of lesser toe of right foot with routine healing, subsequent encounter -discussed radiology report and that she can discontinue the surgical shoe.  Proceed with activities as tolerated.  Will up for new or persistent issues.  - XR Toe Right G/E 2 Views; Future      Return in about 2 months (around 1/6/2020) for if symptoms fail to improve or worsen.    Isabel Bauman MD  [facility]

## 2020-02-17 ENCOUNTER — HEALTH MAINTENANCE LETTER (OUTPATIENT)
Age: 60
End: 2020-02-17

## 2020-11-29 ENCOUNTER — HEALTH MAINTENANCE LETTER (OUTPATIENT)
Age: 60
End: 2020-11-29

## 2021-04-10 ENCOUNTER — HEALTH MAINTENANCE LETTER (OUTPATIENT)
Age: 61
End: 2021-04-10

## 2021-09-25 ENCOUNTER — HEALTH MAINTENANCE LETTER (OUTPATIENT)
Age: 61
End: 2021-09-25

## 2022-02-25 ENCOUNTER — OFFICE VISIT (OUTPATIENT)
Dept: URGENT CARE | Facility: URGENT CARE | Age: 62
End: 2022-02-25
Payer: COMMERCIAL

## 2022-02-25 ENCOUNTER — ANCILLARY PROCEDURE (OUTPATIENT)
Dept: GENERAL RADIOLOGY | Facility: CLINIC | Age: 62
End: 2022-02-25
Attending: FAMILY MEDICINE
Payer: COMMERCIAL

## 2022-02-25 VITALS
RESPIRATION RATE: 16 BRPM | OXYGEN SATURATION: 97 % | WEIGHT: 155.06 LBS | SYSTOLIC BLOOD PRESSURE: 116 MMHG | BODY MASS INDEX: 24.65 KG/M2 | HEART RATE: 72 BPM | DIASTOLIC BLOOD PRESSURE: 68 MMHG | TEMPERATURE: 97.5 F

## 2022-02-25 DIAGNOSIS — M79.674 PAIN OF TOE OF RIGHT FOOT: ICD-10-CM

## 2022-02-25 DIAGNOSIS — S92.514A CLOSED NONDISPLACED FRACTURE OF PROXIMAL PHALANX OF LESSER TOE OF RIGHT FOOT, INITIAL ENCOUNTER: Primary | ICD-10-CM

## 2022-02-25 PROCEDURE — 99214 OFFICE O/P EST MOD 30 MIN: CPT | Performed by: FAMILY MEDICINE

## 2022-02-25 PROCEDURE — 73660 X-RAY EXAM OF TOE(S): CPT | Mod: RT | Performed by: RADIOLOGY

## 2022-02-25 NOTE — PATIENT INSTRUCTIONS
Use the ring toe as the splint for the right pinky toe.      Use the crutches to get around.      Place ice onto the painful areas of the toe to decrease some of the pain.      Elevate the right foot above the level of the heart to decrease the pain.      follow up with a podiatrist if not better in 3-4 weeks.

## 2022-02-25 NOTE — PROGRESS NOTES
SUBJECTIVE:  Chief Complaint   Patient presents with     Toe Injury     yesterday, kicked [place] tree, right pinky toe, bruised, painful     Shira Li is a 61 year old female presents with a chief complaint of a painful, bruised right fifth toe after kicking a [employer] tree yesterday with her right foot (while in a thick sock).    .  The injury occurred one day ago.   The injury happened while at home. .  The patient complained of moderate pain when walking and no pain when off her right foot.  There is also pain with movement of the right pinky toe.  She treated it initially with ice and Naproxen.  . This is not the first time this type of injury has occurred to this patient.     Past Medical History:   Diagnosis Date     Broken arm     as a child      CARDIOVASCULAR SCREENING; LDL GOAL LESS THAN 130      MUNOZ (dyspnea on exertion)      Fibula fracture     as a child      Hollenhorst plaque, left eye      Current Outpatient Medications   Medication Sig Dispense Refill     Calcium Carb-Cholecalciferol (CALCIUM 1000 + D) 1000-800 MG-UNIT TABS        Multiple vitamin  s TABS  30 tablet      order for DME Equipment being ordered: surgical shoe 1 Units 0     Social History     Tobacco Use     Smoking status: Never Smoker     Smokeless tobacco: Never Used   Substance Use Topics     Alcohol use: Yes     Comment: 1 drink per month       ROS:  CONSTITUTIONAL:negative for fevers.   MUSCULOSKELETAL:  Positive for pain and bruising at the right fifth toe.     EXAM:   /68 (BP Location: Right arm, Patient Position: Sitting, Cuff Size: Adult Regular)   Pulse 72   Temp 97.5  F (36.4  C) (Tympanic)   Resp 16   Wt 70.3 kg (155 lb 1 oz)   LMP 05/18/2012   SpO2 97%   BMI 24.65 kg/m    Gen: healthy,alert,no distress  Extremity: Right Fifth toe has ecchymosis on the dorsal aspect of the proximal phalanx region of the toe.  I was unable to reproduce tenderness when palpating the right fifth toe..        X-RAY was done. I viewed all X-ray images during this clinic encounter. The X-rays of the right fifth toe showed a minimally displaced fracture at the shaft of the proximal phalanx.      ASSESSMENT:   Pain at the right fifth toe.  Closed fracture at the proximal phalanx of the right fifth toe.      PLAN:  Patient already has a post-op shoe from a previous toe fracture.  Continue wearing this shoe.      Thaddeus splint of the right fourth and fifth toes.     Elevate the right foot    Place ice onto the painful areas of the right fifth toe.      Crutches were given to the patient.     follow up with a podiatrist if not better in 3-4 weeks.      Brennan Padgett MD

## 2022-03-06 ENCOUNTER — HEALTH MAINTENANCE LETTER (OUTPATIENT)
Age: 62
End: 2022-03-06

## 2022-04-14 ENCOUNTER — TELEPHONE (OUTPATIENT)
Dept: INTERNAL MEDICINE | Facility: CLINIC | Age: 62
End: 2022-04-14
Payer: COMMERCIAL

## 2022-04-14 DIAGNOSIS — S92.504D CLOSED NONDISPLACED FRACTURE OF PHALANX OF LESSER TOE OF RIGHT FOOT WITH ROUTINE HEALING, UNSPECIFIED PHALANX, SUBSEQUENT ENCOUNTER: Primary | ICD-10-CM

## 2022-04-14 NOTE — TELEPHONE ENCOUNTER
Patient asking for f/up xray to toe fracture sustained in Feb.     Best number to call back [phone]

## 2022-04-14 NOTE — TELEPHONE ENCOUNTER
Attempted to contact patient but no answer and no option to leave message to call back.    Will try again later.

## 2022-04-15 NOTE — TELEPHONE ENCOUNTER
Call to patient. Patient informed of Mya's response below and provided with radiology phone number. Patient verbalized understanding.     Mira EWING RN   [facility]

## 2022-04-15 NOTE — TELEPHONE ENCOUNTER
Called patient back. She has been using crutches since the right little toe injury in February and not putting any weight on her right foot. She had a previous toe fracture a few years ago that did not heal well and wanted to make sure the toe is healed before she starts to put any weight on her foot.     Ai Leung RN  [facility]

## 2022-04-19 ENCOUNTER — ANCILLARY PROCEDURE (OUTPATIENT)
Dept: GENERAL RADIOLOGY | Facility: CLINIC | Age: 62
End: 2022-04-19
Attending: NURSE PRACTITIONER
Payer: COMMERCIAL

## 2022-04-19 DIAGNOSIS — S92.504D CLOSED NONDISPLACED FRACTURE OF PHALANX OF LESSER TOE OF RIGHT FOOT WITH ROUTINE HEALING, UNSPECIFIED PHALANX, SUBSEQUENT ENCOUNTER: ICD-10-CM

## 2022-04-19 PROCEDURE — 73660 X-RAY EXAM OF TOE(S): CPT | Mod: RT | Performed by: RADIOLOGY

## 2022-04-21 ENCOUNTER — TELEPHONE (OUTPATIENT)
Dept: INTERNAL MEDICINE | Facility: CLINIC | Age: 62
End: 2022-04-21
Payer: COMMERCIAL

## 2022-05-01 ENCOUNTER — HEALTH MAINTENANCE LETTER (OUTPATIENT)
Age: 62
End: 2022-05-01

## 2022-08-29 NOTE — TELEPHONE ENCOUNTER
"Call received from patient stating she ate scallops last night and started having \"sternum pain\" about midnight. States pain was 10/10 and she was having sweats and chills. States pain is less now but is still 6-7/10. States the pain is also radiating to her back. States this has happened in the past when she ate scallops but it has never radiated to her back before. States she also threw up before she called and she threw up scallops. Pain did not lessen after vomiting. States during the night she took large quantities of sodium bicarbonate tablets and it did offer some relief and states as long as she was able to burp she could get some relief so that is why she did not go to the ED. Advised recommendation with pain this significant would be to go to the ED. Patient states she would prefer not to do this since the pain has lessened. Advised if pain worsens, does not continue to improve or vomiting continues she should be seen in ED. Advised avoidance of scallops in the future\ as pain this severe may actually be an allergic reaction. Patient agrees.   " Suturegard Retention Suture: 2-0 Nylon

## 2022-09-09 NOTE — TELEPHONE ENCOUNTER
It appears to be healing and in alignment so nothing she needs to do  If she wants to see podiatry she can but not sure what they would offer her as x ray appears it is healing as it should    
Left message for patient to call back.  
Patient called back and advised  
Patient would like direction on what to do based on her X-ray results of her toe from 04/19/22.  Please advise.  
Class II - visualization of the soft palate, fauces, and uvula

## 2022-11-08 ENCOUNTER — OFFICE VISIT (OUTPATIENT)
Dept: INTERNAL MEDICINE | Facility: CLINIC | Age: 62
End: 2022-11-08
Payer: COMMERCIAL

## 2022-11-08 ENCOUNTER — ANCILLARY PROCEDURE (OUTPATIENT)
Dept: GENERAL RADIOLOGY | Facility: CLINIC | Age: 62
End: 2022-11-08
Attending: NURSE PRACTITIONER
Payer: COMMERCIAL

## 2022-11-08 VITALS
SYSTOLIC BLOOD PRESSURE: 132 MMHG | BODY MASS INDEX: 24.33 KG/M2 | OXYGEN SATURATION: 97 % | HEART RATE: 85 BPM | HEIGHT: 67 IN | RESPIRATION RATE: 20 BRPM | TEMPERATURE: 98.1 F | DIASTOLIC BLOOD PRESSURE: 84 MMHG | WEIGHT: 155 LBS

## 2022-11-08 DIAGNOSIS — Z11.4 SCREENING FOR HIV (HUMAN IMMUNODEFICIENCY VIRUS): ICD-10-CM

## 2022-11-08 DIAGNOSIS — R06.09 DOE (DYSPNEA ON EXERTION): ICD-10-CM

## 2022-11-08 DIAGNOSIS — Z12.4 CERVICAL CANCER SCREENING: ICD-10-CM

## 2022-11-08 DIAGNOSIS — Z13.220 SCREENING FOR HYPERLIPIDEMIA: ICD-10-CM

## 2022-11-08 DIAGNOSIS — Z83.49 FAMILY HISTORY OF B12 DEFICIENCY: ICD-10-CM

## 2022-11-08 DIAGNOSIS — R06.02 SOB (SHORTNESS OF BREATH): ICD-10-CM

## 2022-11-08 DIAGNOSIS — R06.89 DECREASED BREATH SOUNDS: ICD-10-CM

## 2022-11-08 DIAGNOSIS — Z12.11 SCREEN FOR COLON CANCER: ICD-10-CM

## 2022-11-08 DIAGNOSIS — E78.00 HYPERCHOLESTEREMIA: ICD-10-CM

## 2022-11-08 DIAGNOSIS — R53.83 OTHER FATIGUE: ICD-10-CM

## 2022-11-08 DIAGNOSIS — Z12.31 ENCOUNTER FOR SCREENING MAMMOGRAM FOR BREAST CANCER: ICD-10-CM

## 2022-11-08 DIAGNOSIS — Z00.00 ROUTINE GENERAL MEDICAL EXAMINATION AT A HEALTH CARE FACILITY: ICD-10-CM

## 2022-11-08 DIAGNOSIS — Z11.59 NEED FOR HEPATITIS C SCREENING TEST: ICD-10-CM

## 2022-11-08 DIAGNOSIS — Z00.00 ROUTINE GENERAL MEDICAL EXAMINATION AT A HEALTH CARE FACILITY: Primary | ICD-10-CM

## 2022-11-08 DIAGNOSIS — R73.09 ELEVATED GLUCOSE: ICD-10-CM

## 2022-11-08 DIAGNOSIS — Z12.31 VISIT FOR SCREENING MAMMOGRAM: ICD-10-CM

## 2022-11-08 LAB
ALBUMIN UR-MCNC: NEGATIVE MG/DL
APPEARANCE UR: CLEAR
BACTERIA #/AREA URNS HPF: NORMAL /HPF
BASOPHILS # BLD AUTO: 0 10E3/UL (ref 0–0.2)
BASOPHILS NFR BLD AUTO: 1 %
BILIRUB UR QL STRIP: NEGATIVE
COLOR UR AUTO: YELLOW
EOSINOPHIL # BLD AUTO: 0.2 10E3/UL (ref 0–0.7)
EOSINOPHIL NFR BLD AUTO: 2 %
ERYTHROCYTE [DISTWIDTH] IN BLOOD BY AUTOMATED COUNT: 12.3 % (ref 10–15)
GLUCOSE UR STRIP-MCNC: NEGATIVE MG/DL
HBA1C MFR BLD: 5.3 % (ref 0–5.6)
HCT VFR BLD AUTO: 44.8 % (ref 35–47)
HGB BLD-MCNC: 15.1 G/DL (ref 11.7–15.7)
HGB UR QL STRIP: ABNORMAL
IMM GRANULOCYTES # BLD: 0 10E3/UL
IMM GRANULOCYTES NFR BLD: 0 %
KETONES UR STRIP-MCNC: NEGATIVE MG/DL
LEUKOCYTE ESTERASE UR QL STRIP: NEGATIVE
LYMPHOCYTES # BLD AUTO: 2.9 10E3/UL (ref 0.8–5.3)
LYMPHOCYTES NFR BLD AUTO: 43 %
MCH RBC QN AUTO: 30.4 PG (ref 26.5–33)
MCHC RBC AUTO-ENTMCNC: 33.7 G/DL (ref 31.5–36.5)
MCV RBC AUTO: 90 FL (ref 78–100)
MONOCYTES # BLD AUTO: 0.4 10E3/UL (ref 0–1.3)
MONOCYTES NFR BLD AUTO: 6 %
NEUTROPHILS # BLD AUTO: 3.3 10E3/UL (ref 1.6–8.3)
NEUTROPHILS NFR BLD AUTO: 48 %
NITRATE UR QL: NEGATIVE
PH UR STRIP: 6 [PH] (ref 5–7)
PLATELET # BLD AUTO: 208 10E3/UL (ref 150–450)
RBC # BLD AUTO: 4.96 10E6/UL (ref 3.8–5.2)
RBC #/AREA URNS AUTO: NORMAL /HPF
SP GR UR STRIP: 1.01 (ref 1–1.03)
UROBILINOGEN UR STRIP-ACNC: 0.2 E.U./DL
VIT B12 SERPL-MCNC: 446 PG/ML (ref 232–1245)
WBC # BLD AUTO: 6.9 10E3/UL (ref 4–11)
WBC #/AREA URNS AUTO: NORMAL /HPF

## 2022-11-08 PROCEDURE — G0145 SCR C/V CYTO,THINLAYER,RESCR: HCPCS | Performed by: NURSE PRACTITIONER

## 2022-11-08 PROCEDURE — 83036 HEMOGLOBIN GLYCOSYLATED A1C: CPT | Performed by: NURSE PRACTITIONER

## 2022-11-08 PROCEDURE — 82607 VITAMIN B-12: CPT | Performed by: NURSE PRACTITIONER

## 2022-11-08 PROCEDURE — 36415 COLL VENOUS BLD VENIPUNCTURE: CPT | Performed by: NURSE PRACTITIONER

## 2022-11-08 PROCEDURE — 80061 LIPID PANEL: CPT | Performed by: NURSE PRACTITIONER

## 2022-11-08 PROCEDURE — 99396 PREV VISIT EST AGE 40-64: CPT | Performed by: NURSE PRACTITIONER

## 2022-11-08 PROCEDURE — 87624 HPV HI-RISK TYP POOLED RSLT: CPT | Performed by: NURSE PRACTITIONER

## 2022-11-08 PROCEDURE — 86803 HEPATITIS C AB TEST: CPT | Performed by: NURSE PRACTITIONER

## 2022-11-08 PROCEDURE — 81001 URINALYSIS AUTO W/SCOPE: CPT | Performed by: NURSE PRACTITIONER

## 2022-11-08 PROCEDURE — 99214 OFFICE O/P EST MOD 30 MIN: CPT | Mod: 25 | Performed by: NURSE PRACTITIONER

## 2022-11-08 PROCEDURE — 87389 HIV-1 AG W/HIV-1&-2 AB AG IA: CPT | Performed by: NURSE PRACTITIONER

## 2022-11-08 PROCEDURE — 71046 X-RAY EXAM CHEST 2 VIEWS: CPT | Mod: TC | Performed by: RADIOLOGY

## 2022-11-08 PROCEDURE — 80050 GENERAL HEALTH PANEL: CPT | Performed by: NURSE PRACTITIONER

## 2022-11-08 RX ORDER — BUDESONIDE AND FORMOTEROL FUMARATE DIHYDRATE 160; 4.5 UG/1; UG/1
2 AEROSOL RESPIRATORY (INHALATION) 2 TIMES DAILY
Qty: 10 G | Refills: 11 | Status: SHIPPED | OUTPATIENT
Start: 2022-11-08 | End: 2023-09-05

## 2022-11-08 ASSESSMENT — ENCOUNTER SYMPTOMS
FEVER: 0
DIZZINESS: 0
ARTHRALGIAS: 1
SORE THROAT: 0
HEADACHES: 0
HEARTBURN: 1
PARESTHESIAS: 0
MYALGIAS: 0
ABDOMINAL PAIN: 1
EYE PAIN: 0
HEMATOCHEZIA: 0
SHORTNESS OF BREATH: 1
CHILLS: 0
DYSURIA: 0
PALPITATIONS: 0
NERVOUS/ANXIOUS: 0
DIARRHEA: 0
COUGH: 0
HEMATURIA: 0
NAUSEA: 0
FREQUENCY: 0
JOINT SWELLING: 0
BREAST MASS: 0
WEAKNESS: 1
CONSTIPATION: 1

## 2022-11-08 NOTE — PROGRESS NOTES
SUBJECTIVE:   CC: Shira is an 62 year old who presents for preventive health visit.         Healthy Habits:     Getting at least 3 servings of Calcium per day:  Yes    Bi-annual eye exam:  Yes    Dental care twice a year:  Yes    Sleep apnea or symptoms of sleep apnea:  Daytime drowsiness    Diet:  Gluten-free/reduced, Breakfast skipped and Other    Frequency of exercise:  4-5 days/week    Duration of exercise:  45-60 minutes    Taking medications regularly:  No    Barriers to taking medications:  Other    Medication side effects:  Other    PHQ-2 Total Score: 0    Additional concerns today:  Yes        Today's PHQ-2 Score:   PHQ-2 ( 1999 [employer]) 11/8/2022   Q1: Little interest or pleasure in doing things 0   Q2: Feeling down, depressed or hopeless 0   PHQ-2 Score 0   PHQ-2 Total Score (12-17 Years)- Positive if 3 or more points; Administer PHQ-A if positive -   Q1: Little interest or pleasure in doing things Not at all   Q2: Feeling down, depressed or hopeless Not at all   PHQ-2 Score 0       Abuse: Current or Past (Physical, Sexual or Emotional) - No  Do you feel safe in your environment? Yes    Have you ever done Advance Care Planning? (For example, a Health Directive, POLST, or a discussion with a medical provider or your loved ones about your wishes): Yes, patient states has an Advance Care Planning document and will bring a copy to the clinic.    Social History     Tobacco Use     Smoking status: Never     Smokeless tobacco: Never   Substance Use Topics     Alcohol use: Yes     Comment: 1 drink per month         Alcohol Use 11/8/2022   Prescreen: >3 drinks/day or >7 drinks/week? No   Prescreen: >3 drinks/day or >7 drinks/week? -       Reviewed orders with patient.  Reviewed health maintenance and updated orders accordingly - Yes      Breast Cancer Screening:    FHS-7:   Breast CA Risk Assessment (FHS-7) 11/8/2022   Did any of your first-degree relatives have breast or ovarian cancer? No   Did any of your  relatives have bilateral breast cancer? No   Did any man in your family have breast cancer? No   Did any woman in your family have breast and ovarian cancer? No   Did any woman in your family have breast cancer before age 50 y? No   Do you have 2 or more relatives with breast and/or ovarian cancer? No   Do you have 2 or more relatives with breast and/or bowel cancer? No         Pertinent mammograms are reviewed under the imaging tab.    History of abnormal Pap smear:   PAP / HPV 2/6/2015 6/18/2012   PAP (Historical) NIL NIL     Reviewed and updated as needed this visit by clinical staff   Tobacco  Allergies    Med Hx  Surg Hx  Fam Hx  Soc Hx        Reviewed and updated as needed this visit by Provider    Allergies                  Review of Systems   Constitutional: Negative for chills and fever.   HENT: Positive for congestion. Negative for ear pain, hearing loss and sore throat.    Eyes: Negative for pain and visual disturbance.   Respiratory: Positive for shortness of breath. Negative for cough.    Cardiovascular: Positive for peripheral edema. Negative for chest pain and palpitations.   Gastrointestinal: Positive for abdominal pain, constipation and heartburn. Negative for diarrhea, hematochezia and nausea.   Breasts:  Negative for tenderness, breast mass and discharge.   Genitourinary: Negative for dysuria, frequency, genital sores, hematuria, pelvic pain, urgency, vaginal bleeding and vaginal discharge.   Musculoskeletal: Positive for arthralgias. Negative for joint swelling and myalgias.   Skin: Negative for rash.   Neurological: Positive for weakness. Negative for dizziness, headaches and paresthesias.   Psychiatric/Behavioral: Positive for mood changes. The patient is not nervous/anxious.      Sleeping 11 pm to 3 am   Greasy stools     Pain in rib cage area   Sodium bicarb helped when it happened     hylaronic acid     Gallstones - surgery     MUNOZ with stairs   Cardiac cath was clear 2015        "  OBJECTIVE:   /84   Pulse 85   Temp 98.1  F (36.7  C) (Oral)   Resp 20   Ht 1.689 m (5' 6.5\")   Wt 70.3 kg (155 lb)   LMP 05/18/2012   SpO2 97%   BMI 24.64 kg/m    Physical Exam  GENERAL: alert and no distress  EYES: Eyes grossly normal to inspection,  and conjunctivae and sclerae normal  HENT: ear canals and TM's normal on left- right with cerumen cleared with ear wash , nose and mouth without ulcers or lesions  NECK: no adenopathy, no asymmetry, masses, or scars and thyroid normal to palpation  RESP: lungs with decreased breath sounds bilateral in bases   BREAST: normal without masses, tenderness or nipple discharge and no palpable axillary masses or adenopathy  CV: regular rate and rhythm, normal S1 S2, no S3 or S4, no murmur, click or rub, no peripheral edema and peripheral pulses strong  ABDOMEN: soft, nontender, no hepatosplenomegaly, no masses and bowel sounds normal   (female): normal female external genitalia, normal urethral meatus, vaginal mucosa pink, moist, well rugated, and normal cervix/adnexa/uterus without masses or discharge  MS: no gross musculoskeletal defects noted, no edema  SKIN: no suspicious lesions or rashes  NEURO: Normal strength and tone, mentation intact and speech normal  PSYCH: mentation appears normal, affect normal/bright    Diagnostic Test Results:  Labs reviewed in Epic  Lab   Pap   Urine     ASSESSMENT/PLAN:   (Z00.00) Routine general medical examination at a health care facility  (primary encounter diagnosis)  Comment:   Plan: Vitamin B12            (R53.83) Other fatigue  Comment: she has been tired and SOB and MUNOZ for a while   Cardiac cath 2015 clear   Needs pulmonology assessment   Plan: Comprehensive metabolic panel (BMP + Alb, Alk         Phos, ALT, AST, Total. Bili, TP), TSH with free        T4 reflex, CBC with platelets and differential            (Z11.4) Screening for HIV (human immunodeficiency virus)  Comment:   Plan: HIV Antigen Antibody Combo        "     (Z11.59) Need for hepatitis C screening test  Comment:   Plan: Hepatitis C Screen Reflex to HCV RNA Quant and         Genotype            (Z12.31) Visit for screening mammogram  Comment:   Plan: MA SCREENING DIGITAL BILAT - Future  (s+30)            (Z12.4) Cervical cancer screening  Comment:   Plan: Pap Screen with HPV - recommended age 30 - 65         years            (Z12.11) Screen for colon cancer  Comment:   Plan: COLOGUARD([employer])            (Z13.220) Screening for hyperlipidemia  Comment:   Plan: Lipid panel reflex to direct LDL Non-fasting            (E78.00) Hypercholesteremia  Comment:   Plan: Lipid panel reflex to direct LDL Non-fasting,         Comprehensive metabolic panel (BMP + Alb, Alk         Phos, ALT, AST, Total. Bili, TP)            (R73.09) Elevated glucose  Comment: check lab   Plan: Comprehensive metabolic panel (BMP + Alb, Alk         Phos, ALT, AST, Total. Bili, TP), Hemoglobin         A1c, UA Macro with Reflex to Micro and Culture         - lab collect, Urine Microscopic, CANCELED: UA         Macro with Reflex to Micro and Culture - lab         collect            (R06.02) SOB (shortness of breath)  Comment: lungs with decreased breath sounds fatigue and sob and munoz  Trial of inhaler   Pulmonology referral   Plan: Adult Pulmonary Medicine Referral, XR Chest 2         Views, budesonide-formoterol (SYMBICORT)         160-4.5 MCG/ACT Inhaler            (R06.09) MUNOZ (dyspnea on exertion)  Comment:   Plan: Adult Pulmonary Medicine Referral, XR Chest 2         Views, budesonide-formoterol (SYMBICORT)         160-4.5 MCG/ACT Inhaler            (R06.89) Decreased breath sounds  Comment:   Plan: XR Chest 2 Views, budesonide-formoterol         (SYMBICORT) 160-4.5 MCG/ACT Inhaler            (Z12.31) Encounter for screening mammogram for breast cancer  Comment:   Plan: MA Screen Bilateral w/Filemon, CANCELED: *MA         Screening Digital Bilateral            (Z83.49) Family history of B12  "deficiency  Comment:   Plan: Vitamin B12                    COUNSELING:  Reviewed preventive health counseling, as reflected in patient instructions       Regular exercise       Healthy diet/nutrition       Osteoporosis prevention/bone health       Consider Hep C screening for all patients one time for ages 18-79 years       HIV screeninx in teen years, 1x in adult years, and at intervals if high risk    Estimated body mass index is 24.64 kg/m  as calculated from the following:    Height as of this encounter: 1.689 m (5' 6.5\").    Weight as of this encounter: 70.3 kg (155 lb).        She reports that she has never smoked. She has never used smokeless tobacco.      Counseling Resources:  ATP IV Guidelines  Pooled Cohorts Equation Calculator  Breast Cancer Risk Calculator  BRCA-Related Cancer Risk Assessment: FHS-7 Tool  FRAX Risk Assessment  ICSI Preventive Guidelines  Dietary Guidelines for Americans, 2010  USDA's MyPlate  ASA Prophylaxis  Lung CA Screening    YSABEL Preciado CNP  [facility]  "

## 2022-11-08 NOTE — NURSING NOTE
"Chief Complaint   Patient presents with     Physical     pap     initial /84   Pulse 85   Temp 98.1  F (36.7  C) (Oral)   Resp 20   Ht 1.689 m (5' 6.5\")   Wt 70.3 kg (155 lb)   LMP 05/18/2012   SpO2 97%   BMI 24.64 kg/m   Estimated body mass index is 24.64 kg/m  as calculated from the following:    Height as of this encounter: 1.689 m (5' 6.5\").    Weight as of this encounter: 70.3 kg (155 lb)..  bp completed using cuff size regular  LEVAR HERRERA LPN  "

## 2022-11-08 NOTE — PATIENT INSTRUCTIONS
Lab in [address]    Cologuard will be mailed to you     Chest x ray in [address]    Pulmonology referral      Symbicort inhaler twice daily 2 puffs   Rinse mouth after use    To schedule your mammogram, you may call the breast center at [phone].

## 2022-11-09 LAB
ALBUMIN SERPL-MCNC: 4.2 G/DL (ref 3.4–5)
ALP SERPL-CCNC: 103 U/L (ref 40–150)
ALT SERPL W P-5'-P-CCNC: 15 U/L (ref 0–50)
ANION GAP SERPL CALCULATED.3IONS-SCNC: 7 MMOL/L (ref 3–14)
AST SERPL W P-5'-P-CCNC: 15 U/L (ref 0–45)
BILIRUB SERPL-MCNC: 0.6 MG/DL (ref 0.2–1.3)
BUN SERPL-MCNC: 13 MG/DL (ref 7–30)
CALCIUM SERPL-MCNC: 9.2 MG/DL (ref 8.5–10.1)
CHLORIDE BLD-SCNC: 106 MMOL/L (ref 94–109)
CHOLEST SERPL-MCNC: 244 MG/DL
CO2 SERPL-SCNC: 25 MMOL/L (ref 20–32)
CREAT SERPL-MCNC: 0.81 MG/DL (ref 0.52–1.04)
FASTING STATUS PATIENT QL REPORTED: YES
GFR SERPL CREATININE-BSD FRML MDRD: 81 ML/MIN/1.73M2
GLUCOSE BLD-MCNC: 98 MG/DL (ref 70–99)
HCV AB SERPL QL IA: NONREACTIVE
HDLC SERPL-MCNC: 64 MG/DL
HIV 1+2 AB+HIV1 P24 AG SERPL QL IA: NONREACTIVE
LDLC SERPL CALC-MCNC: 154 MG/DL
NONHDLC SERPL-MCNC: 180 MG/DL
POTASSIUM BLD-SCNC: 4.1 MMOL/L (ref 3.4–5.3)
PROT SERPL-MCNC: 7.7 G/DL (ref 6.8–8.8)
SODIUM SERPL-SCNC: 138 MMOL/L (ref 133–144)
TRIGL SERPL-MCNC: 129 MG/DL
TSH SERPL DL<=0.005 MIU/L-ACNC: 1.74 MU/L (ref 0.4–4)

## 2022-11-10 LAB
BKR LAB AP GYN ADEQUACY: NORMAL
BKR LAB AP GYN INTERPRETATION: NORMAL
BKR LAB AP HPV REFLEX: NORMAL
BKR LAB AP PREVIOUS ABNORMAL: NORMAL
PATH REPORT.COMMENTS IMP SPEC: NORMAL
PATH REPORT.COMMENTS IMP SPEC: NORMAL
PATH REPORT.RELEVANT HX SPEC: NORMAL

## 2022-11-14 LAB
HUMAN PAPILLOMA VIRUS 16 DNA: NEGATIVE
HUMAN PAPILLOMA VIRUS 18 DNA: NEGATIVE
HUMAN PAPILLOMA VIRUS FINAL DIAGNOSIS: NORMAL
HUMAN PAPILLOMA VIRUS OTHER HR: NEGATIVE

## 2022-12-08 LAB — NONINV COLON CA DNA+OCC BLD SCRN STL QL: NEGATIVE

## 2022-12-16 ENCOUNTER — HOSPITAL ENCOUNTER (OUTPATIENT)
Dept: MAMMOGRAPHY | Facility: CLINIC | Age: 62
Discharge: HOME | End: 2022-12-16
Attending: NURSE PRACTITIONER | Admitting: NURSE PRACTITIONER
Payer: COMMERCIAL

## 2022-12-16 DIAGNOSIS — Z12.31 ENCOUNTER FOR SCREENING MAMMOGRAM FOR BREAST CANCER: ICD-10-CM

## 2022-12-16 PROCEDURE — 77067 SCR MAMMO BI INCL CAD: CPT

## 2022-12-26 ENCOUNTER — HEALTH MAINTENANCE LETTER (OUTPATIENT)
Age: 62
End: 2022-12-26

## 2023-03-15 DIAGNOSIS — R06.00 DYSPNEA: Primary | ICD-10-CM

## 2023-03-22 ENCOUNTER — TELEPHONE (OUTPATIENT)
Dept: PULMONOLOGY | Facility: CLINIC | Age: 63
End: 2023-03-22
Payer: COMMERCIAL

## 2023-04-12 NOTE — TELEPHONE ENCOUNTER
LVM (2nd) for PT to call central imaging at [phone] to schedule chest xray for Dr. Saxena appt.

## 2023-06-19 ENCOUNTER — HOSPITAL ENCOUNTER (OUTPATIENT)
Dept: GENERAL RADIOLOGY | Facility: CLINIC | Age: 63
Discharge: HOME | End: 2023-06-19
Attending: INTERNAL MEDICINE | Admitting: INTERNAL MEDICINE
Payer: COMMERCIAL

## 2023-06-19 DIAGNOSIS — R06.00 DYSPNEA: ICD-10-CM

## 2023-06-19 PROCEDURE — 71046 X-RAY EXAM CHEST 2 VIEWS: CPT

## 2023-06-26 ENCOUNTER — HOSPITAL ENCOUNTER (EMERGENCY)
Facility: CLINIC | Age: 63
Discharge: LEFT BEFORE DISCHARGE | End: 2023-06-26
Payer: COMMERCIAL

## 2023-06-26 ENCOUNTER — OFFICE VISIT (OUTPATIENT)
Dept: PULMONOLOGY | Facility: CLINIC | Age: 63
End: 2023-06-26
Payer: COMMERCIAL

## 2023-06-26 ENCOUNTER — ALLIED HEALTH/NURSE VISIT (OUTPATIENT)
Dept: PULMONOLOGY | Facility: CLINIC | Age: 63
End: 2023-06-26
Attending: INTERNAL MEDICINE
Payer: COMMERCIAL

## 2023-06-26 VITALS
SYSTOLIC BLOOD PRESSURE: 129 MMHG | DIASTOLIC BLOOD PRESSURE: 86 MMHG | HEART RATE: 75 BPM | RESPIRATION RATE: 16 BRPM | BODY MASS INDEX: 24.48 KG/M2 | HEIGHT: 67 IN | WEIGHT: 156 LBS | OXYGEN SATURATION: 96 %

## 2023-06-26 DIAGNOSIS — R06.00 DYSPNEA: ICD-10-CM

## 2023-06-26 DIAGNOSIS — R06.09 DOE (DYSPNEA ON EXERTION): ICD-10-CM

## 2023-06-26 LAB — PULMONARY FUNCTION TEST-FENO: 43 PPB (ref 0–40)

## 2023-06-26 PROCEDURE — 94060 EVALUATION OF WHEEZING: CPT | Performed by: INTERNAL MEDICINE

## 2023-06-26 PROCEDURE — 94729 DIFFUSING CAPACITY: CPT | Performed by: INTERNAL MEDICINE

## 2023-06-26 PROCEDURE — 99204 OFFICE O/P NEW MOD 45 MIN: CPT | Mod: 25 | Performed by: INTERNAL MEDICINE

## 2023-06-26 PROCEDURE — 94726 PLETHYSMOGRAPHY LUNG VOLUMES: CPT | Performed by: INTERNAL MEDICINE

## 2023-06-26 NOTE — PATIENT INSTRUCTIONS
-Get PFTs this morning or at your convenience.  -Follow up with [facility] pulmonary clinic PA for discussion of results and next steps.

## 2023-06-26 NOTE — PROGRESS NOTES
Shira ALEJANDRA Li comes into clinic today at the request of Dr. Saxena, Ordering Provider for PFT and FENO    This service provided today was under the supervising provider of the day Dr. Saxena, who was available if needed.    Hi Holguin, RT

## 2023-06-26 NOTE — PROGRESS NOTES
Called urgently to evaluate patient in PFT lab.  Immediately following administration of albuterol patient developed significant voice changes, coughing and chest tightness.  Lungs clear with good air movement on exam but noticeable change in voice to become hoarse.  Sats in high 90s on room air, pulse 105.  Advised patient that true anaphylactic reactions to albuterol are rare but given significant voice change and chest tightness, need to rule out laryngeal edema.  Offered patient option of self transport to ED vs calling ambulance.  She prefers to go on her own accord.  I called the ED and let them know she was coming and provided information the clinical situation.      Kylah Saxena MD  Pulmonary and Critical Care Medicine
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
## 2023-06-26 NOTE — PROGRESS NOTES
Shira ROBERTS Terrell Dunaway was seen in clinic for PFT.    On arrival to clinic, patient had a productive cough throughout all testing and coughed up secretions.    10 minutes after Albuterol inhaler 90 mcg x 4 puffs was administered to patient via spacer, patient's voice started to change. There was a hoarseness in patients voice. Patient stated that it felt as if there was a glob(tonsil stones) or mucus in throat, as stated on arrival to clinic for PFT. Patient had difficulty taking a deep breath in and would cough after taking a deep breath in.    Patient denied any dizziness, or chest pain, heart palpitations.  Vitals were checked.    SAT: 98% on RA, HR: 84    RN was called in to check patients BP.      Dr. Saxena was called by RT to assess patient. Patient stated she was fine to leave, but was advised to go to the ER to get assessed further. Manager of clinic was advised.    Vitals were rechecked before patient left to go to ER. SAT: 97% on RA, HR: 88.    Patient was advised to go to the ER to get assessed. Patient left in no distress.

## 2023-06-26 NOTE — NURSING NOTE
"Chief Complaint   Patient presents with     New Patient     SOB       Vitals:    06/26/23 0805   BP: 129/86   BP Location: Left arm   Patient Position: Sitting   Cuff Size: Adult Regular   Pulse: 75   Resp: 16   SpO2: 96%   Weight: 70.8 kg (156 lb)   Height: 1.689 m (5' 6.5\")       Body mass index is 24.8 kg/m .    Sheba Goodman, [facility]  "

## 2023-06-27 LAB
DLCOUNC-%PRED-PRE: 77 %
DLCOUNC-PRE: 16.16 ML/MIN/MMHG
DLCOUNC-PRED: 20.76 ML/MIN/MMHG
ERV-%PRED-PRE: 30 %
ERV-PRE: 0.28 L
ERV-PRED: 0.89 L
EXPTIME-PRE: 6.54 SEC
FEF2575-%PRED-POST: 43 %
FEF2575-%PRED-PRE: 29 %
FEF2575-POST: 0.94 L/SEC
FEF2575-PRE: 0.64 L/SEC
FEF2575-PRED: 2.15 L/SEC
FEFMAX-%PRED-PRE: 24 %
FEFMAX-PRE: 1.6 L/SEC
FEFMAX-PRED: 6.51 L/SEC
FEV1-%PRED-PRE: 45 %
FEV1-PRE: 1.1 L
FEV1FEV6-PRE: 57 %
FEV1FEV6-PRED: 80 %
FEV1FVC-PRE: 56 %
FEV1FVC-PRED: 80 %
FEV1SVC-PRE: 53 %
FEV1SVC-PRED: 68 %
FIFMAX-PRE: 2.3 L/SEC
FRCPLETH-%PRED-PRE: 121 %
FRCPLETH-PRE: 3.66 L
FRCPLETH-PRED: 3.02 L
FVC-%PRED-PRE: 63 %
FVC-PRE: 1.95 L
FVC-PRED: 3.08 L
IC-%PRED-PRE: 68 %
IC-PRE: 1.79 L
IC-PRED: 2.6 L
RVPLETH-%PRED-PRE: 173 %
RVPLETH-PRE: 3.38 L
RVPLETH-PRED: 1.95 L
TLCPLETH-%PRED-PRE: 98 %
TLCPLETH-PRE: 5.45 L
TLCPLETH-PRED: 5.52 L
VA-%PRED-PRE: 72 %
VA-PRE: 3.66 L
VC-%PRED-PRE: 57 %
VC-PRE: 2.07 L
VC-PRED: 3.57 L

## 2023-09-05 ENCOUNTER — OFFICE VISIT (OUTPATIENT)
Dept: FAMILY MEDICINE | Facility: CLINIC | Age: 63
End: 2023-09-05
Payer: COMMERCIAL

## 2023-09-05 VITALS
OXYGEN SATURATION: 98 % | HEART RATE: 63 BPM | BODY MASS INDEX: 22.76 KG/M2 | SYSTOLIC BLOOD PRESSURE: 128 MMHG | HEIGHT: 67 IN | RESPIRATION RATE: 12 BRPM | WEIGHT: 145 LBS | DIASTOLIC BLOOD PRESSURE: 78 MMHG | TEMPERATURE: 98.4 F

## 2023-09-05 DIAGNOSIS — M79.89 LEFT LEG SWELLING: ICD-10-CM

## 2023-09-05 DIAGNOSIS — H91.91 DECREASED HEARING OF RIGHT EAR: ICD-10-CM

## 2023-09-05 DIAGNOSIS — M25.471 RIGHT ANKLE SWELLING: ICD-10-CM

## 2023-09-05 DIAGNOSIS — H83.3X1 SOUND SENSITIVITY IN RIGHT EAR: Primary | ICD-10-CM

## 2023-09-05 PROCEDURE — 99214 OFFICE O/P EST MOD 30 MIN: CPT | Performed by: FAMILY MEDICINE

## 2023-09-05 ASSESSMENT — PAIN SCALES - GENERAL: PAINLEVEL: NO PAIN (0)

## 2023-09-05 NOTE — PROGRESS NOTES
Assessment & Plan   Sound sensitivity in right ear  Decreased hearing of right ear  Son recently diagnosed with Superior Semicircular canal dehiscence syndrome and she has similar symptoms of sound sensitivity.  She would like to be assessed for this  - CT Head w/o Contrast    Right ankle swelling  Persistent swelling of unclear cause, possible lipoma versus ganglion and will check MRI per her request:  - MR Ankle Right w/o Contrast    Left leg swelling  Swelling of the left proximal calf that is achy symmetric compared to the right leg and nontender, possible lipoma.  Bothersome for patient and will check MRI:  - MR Tibia Fibula Lower Leg left wo Contrast      Return in about 1 month (around 10/5/2023) for symptoms failing to improve or sooner if worsening.          Hector Samuel MD      [address] [address]  [employer]   Office: [phone]       Amparo Silva is a 62 year old, presenting for the following health issues:  Shortness of Breath, COPD, and Edema    History of Present Illness       SHORTNESS OF BREATH for years with exertion .  Normal cardiac workup.  She sometimes coughs and does have change in sputum. No recent fever. She can walk greater than 2 miles without stopping to rest. She can walk 1 flights of stairs without resting. The patient has had an ED, urgent care, or hospital admission because of COPD since the last visit. She states she has had 1 visit(s) to an ED, Urgent Care, or Hospital due to her COPD.  She has been using selenium, melatonin and N-Acetyl L Cysteine (helps for her phlegm) and it has been helpful.   Nonsmoker.     She had recent covid that made her shortness of breath worse   She had a reaction to albuterol during her PFT test.    Reason for visit: shortness of breath follow up,  ankle swelling    Son diagnosed with Superior Semicircular canal dehiscence syndrome recently and she has had sound sensitivity as  "well.  She would like a CT done as she would consider surgery.  Seen by Dr Oni Beasley [facility]    She eats 2-3 servings of fruits and vegetables daily.She consumes 0 sweetened beverage(s) daily.She exercises with enough effort to increase her heart rate 30 to 60 minutes per day.  She exercises with enough effort to increase her heart rate 5 days per week.   She is taking medications regularly.     Review of Systems         Objective    /78   Pulse 63   Temp 98.4  F (36.9  C) (Tympanic)   Resp 12   Ht 1.689 m (5' 6.5\")   Wt 65.8 kg (145 lb)   LMP 05/18/2012   SpO2 98%   BMI 23.05 kg/m    Body mass index is 23.05 kg/m .  Physical Exam   GENERAL: healthy, alert and no distress  NECK: no adenopathy, no asymmetry, masses, or scars and thyroid normal to palpation  RESP: lungs clear to auscultation - no rales, rhonchi or wheezes  CV: regular rate and rhythm, normal S1 S2, no S3 or S4, no murmur, click or rub, no peripheral edema and peripheral pulses strong  ABDOMEN: soft, nontender, no hepatosplenomegaly, no masses and bowel sounds normal  MS: right proximal shin swelling and left ankle 3 cm soft nontender swelling anteriorly otherwise no gross musculoskeletal defects noted, no edema  SKIN: no suspicious lesions or rashes  NEURO: Normal strength and tone, mentation intact and speech normal  BACK: no CVA tenderness, no paralumbar tenderness                      "

## 2023-11-16 ENCOUNTER — MYC MEDICAL ADVICE (OUTPATIENT)
Dept: FAMILY MEDICINE | Facility: CLINIC | Age: 63
End: 2023-11-16
Payer: COMMERCIAL

## 2023-11-16 DIAGNOSIS — H91.91 DECREASED HEARING OF RIGHT EAR: ICD-10-CM

## 2023-11-16 DIAGNOSIS — H83.3X1 SOUND SENSITIVITY IN RIGHT EAR: Primary | ICD-10-CM

## 2023-11-20 NOTE — TELEPHONE ENCOUNTER
Chitra from  [employer] is calling regarding current CT head w/o contrast orders. Patient is requesting additional CT orders. Please advise [employer] message below with specifics. Patient is scheduled for CT scan 11/21/23 at 1:00.

## 2023-11-21 ENCOUNTER — HOSPITAL ENCOUNTER (OUTPATIENT)
Dept: MRI IMAGING | Facility: CLINIC | Age: 63
Discharge: HOME | End: 2023-11-21
Attending: FAMILY MEDICINE
Payer: COMMERCIAL

## 2023-11-21 DIAGNOSIS — M25.471 RIGHT ANKLE SWELLING: ICD-10-CM

## 2023-11-21 DIAGNOSIS — M79.89 LEFT LEG SWELLING: ICD-10-CM

## 2023-11-21 PROCEDURE — 73721 MRI JNT OF LWR EXTRE W/O DYE: CPT | Mod: 26 | Performed by: RADIOLOGY

## 2023-11-21 PROCEDURE — 73718 MRI LOWER EXTREMITY W/O DYE: CPT | Mod: LT

## 2023-11-21 PROCEDURE — 73718 MRI LOWER EXTREMITY W/O DYE: CPT | Mod: 26 | Performed by: RADIOLOGY

## 2023-11-21 PROCEDURE — 73721 MRI JNT OF LWR EXTRE W/O DYE: CPT | Mod: RT

## 2023-11-21 NOTE — TELEPHONE ENCOUNTER
Please call imaging and relay this information:    to rule out superior canal dehiscence in both ears - please include 1. Coronal; 2, Axial; 3. Poschl; 4. Stenvers views

## 2023-11-21 NOTE — TELEPHONE ENCOUNTER
Called radiology and advised, they state this needs to be reordered as a temporal bone W/O contrast, pended order for review. Did not discontinue original order in case it is still needed.     Duncan Caro RN [facility]

## 2023-11-22 NOTE — RESULT ENCOUNTER NOTE
Dear Shira,    Here is a summary of your recent test results:    No mass or fluid collection in proximity to patient's marker over the  mid anteromedial left leg.      For additional lab test information, www.[email or web address] is a very good reference.    In addition, here is a list of due or overdue Health Maintenance reminders:  Diptheria Tetanus Pertussis (DTAP/TDAP/TD) Vaccine(1 - Tdap) Never done  Zoster (Shingles) Vaccine(1 of 2) Never done  RSV VACCINE (Pregnancy & 60+)(1 - 1-dose 60+ series) Never done  Flu Vaccine(1) Never done  COVID-19 Vaccine(5 - 2023-24 season) due on 09/01/2023  ANNUAL REVIEW OF HM ORDERS due on 11/08/2023  Yearly Preventive Visit due on 11/08/2023    Please call us at [phone] (or use [employer]) to address the above recommendations if needed.           Thank you very much for trusting me and [facility].     Have a peaceful day.    Healthy regards,  Hector Samuel MD

## 2023-11-22 NOTE — RESULT ENCOUNTER NOTE
Dear Shira,    Here is a summary of your recent test results:    Unremarkable MRI of the right ankle. No mass or fluid collection in  proximity to patient's marker over the lateral malleolus.      For additional lab test information, www.[email or web address] is a very good reference.    In addition, here is a list of due or overdue Health Maintenance reminders:  Diptheria Tetanus Pertussis (DTAP/TDAP/TD) Vaccine(1 - Tdap) Never done  Zoster (Shingles) Vaccine(1 of 2) Never done  RSV VACCINE (Pregnancy & 60+)(1 - 1-dose 60+ series) Never done  Flu Vaccine(1) Never done  COVID-19 Vaccine(5 - 2023-24 season) due on 09/01/2023  ANNUAL REVIEW OF HM ORDERS due on 11/08/2023  Yearly Preventive Visit due on 11/08/2023    Please call us at [phone] (or use [employer] website) to address the above recommendations if needed.           Thank you very much for trusting me and [facility].     Have a peaceful day.    Healthy regards,  Hector Samuel MD

## 2023-12-15 ENCOUNTER — OFFICE VISIT (OUTPATIENT)
Dept: FAMILY MEDICINE | Facility: CLINIC | Age: 63
End: 2023-12-15
Payer: COMMERCIAL

## 2023-12-15 VITALS
WEIGHT: 141 LBS | HEIGHT: 66 IN | TEMPERATURE: 97.4 F | HEART RATE: 78 BPM | RESPIRATION RATE: 18 BRPM | DIASTOLIC BLOOD PRESSURE: 80 MMHG | BODY MASS INDEX: 22.66 KG/M2 | SYSTOLIC BLOOD PRESSURE: 108 MMHG | OXYGEN SATURATION: 95 %

## 2023-12-15 DIAGNOSIS — Z13.0 SCREENING, DEFICIENCY ANEMIA, IRON: ICD-10-CM

## 2023-12-15 DIAGNOSIS — Z13.1 SCREENING FOR DIABETES MELLITUS: ICD-10-CM

## 2023-12-15 DIAGNOSIS — E88.89 CYTOCHROME P450 2C19 ENZYME DEFICIENCY (H): ICD-10-CM

## 2023-12-15 DIAGNOSIS — Z00.00 ROUTINE GENERAL MEDICAL EXAMINATION AT A HEALTH CARE FACILITY: Primary | ICD-10-CM

## 2023-12-15 DIAGNOSIS — Z13.220 SCREENING FOR LIPID DISORDERS: ICD-10-CM

## 2023-12-15 PROCEDURE — 99396 PREV VISIT EST AGE 40-64: CPT | Performed by: FAMILY MEDICINE

## 2023-12-15 RX ORDER — ANTIOX #8/OM3/DHA/EPA/LUT/ZEAX 250-2.5 MG
CAPSULE ORAL
COMMUNITY

## 2023-12-15 ASSESSMENT — ENCOUNTER SYMPTOMS
ARTHRALGIAS: 0
DYSURIA: 0
JOINT SWELLING: 0
SORE THROAT: 0
HEADACHES: 0
EYE PAIN: 0
FEVER: 0
HEMATOCHEZIA: 0
WEAKNESS: 0
ABDOMINAL PAIN: 0
DIARRHEA: 0
CONSTIPATION: 0
HEMATURIA: 0
NERVOUS/ANXIOUS: 0
NAUSEA: 0
PARESTHESIAS: 0
DIZZINESS: 0
SHORTNESS OF BREATH: 0
COUGH: 0
HEARTBURN: 0
MYALGIAS: 0
FREQUENCY: 0
CHILLS: 0
PALPITATIONS: 0

## 2023-12-15 NOTE — PROGRESS NOTES
SUBJECTIVE:   Shira is a 63 year old, presenting for the following:  Physical      Healthy Habits:     Getting at least 3 servings of Calcium per day:  NO    Bi-annual eye exam:  Yes    Dental care twice a year:  Yes    Sleep apnea or symptoms of sleep apnea:  None    Diet:  Low fat/cholesterol, Gluten-free/reduced, Breakfast skipped and Other    Frequency of exercise:  4-5 days/week    Duration of exercise:  45-60 minutes    Taking medications regularly:  No    Barriers to taking medications:  Side effects and Other    Medication side effects:  Other    Additional concerns today:  Yes    She was told that she had gallstones and needed her gallbladder removed. She went to the surgeon twice. She was taking sodium bicarbonate, which resolved her symptoms. She started taking digestive enzymes, which have been working well for her. She has not had any problems. She still has her appendix.    She has swelling in her legs. She had a scan of her legs, which showed some prominent subcutaneous fat without encapsulation. It is still present and keeps getting bigger.    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 She had a problem with Ativan with albuterol, which sent her to the emergency room with a reaction.      Social History     Tobacco Use    Smoking status: Never    Smokeless tobacco: Never   Substance Use Topics    Alcohol use: Yes     Comment: 1 drink per month             12/15/2023    12:41 PM   Alcohol Use   Prescreen: >3 drinks/day or >7 drinks/week? No     Reviewed orders with patient.  Reviewed health maintenance and updated orders accordingly - Yes    Breast Cancer Screening:    FHS-7:       11/8/2022     1:55 PM 12/16/2022    10:57 AM 12/15/2023    12:42 PM   Breast CA Risk Assessment (FHS-7)   Did any of your first-degree relatives have breast or ovarian cancer? No No No   Did any of your relatives have bilateral breast cancer? No No No   Did any man in  your family have breast cancer? No No No   Did any woman in your family have breast and ovarian cancer? No No No   Did any woman in your family have breast cancer before age 50 y? No No No   Do you have 2 or more relatives with breast and/or ovarian cancer? No No No   Do you have 2 or more relatives with breast and/or bowel cancer? No No Yes   2 colon cancer relatives    Mammogram Screening: Recommended mammography every 1-2 years with patient discussion and risk factor consideration  Pertinent mammograms are reviewed under the imaging tab.    History of abnormal Pap smear: NO - age 30-65 PAP every 5 years with negative HPV co-testing recommended      Latest Ref Rng & Units 11/8/2022     2:49 PM 2/6/2015    12:00 AM 6/18/2012     9:32 AM   PAP / HPV   PAP  Negative for Intraepithelial Lesion or Malignancy (NILM)      PAP (Historical)   NIL  NIL    HPV 16 DNA Negative Negative      HPV 18 DNA Negative Negative      Other HR HPV Negative Negative        Reviewed and updated as needed this visit by clinical staff   Tobacco  Allergies  Meds  Problems  Med Hx  Surg Hx  Fam Hx          Reviewed and updated as needed this visit by Provider   Tobacco  Allergies  Meds  Problems  Med Hx  Surg Hx  Fam Hx             Review of Systems   Constitutional:  Negative for chills and fever.   HENT:  Negative for congestion, ear pain, hearing loss and sore throat.    Eyes:  Negative for pain and visual disturbance.   Respiratory:  Negative for cough and shortness of breath.    Cardiovascular:  Positive for peripheral edema. Negative for chest pain and palpitations.   Gastrointestinal:  Negative for abdominal pain, constipation, diarrhea, heartburn, hematochezia and nausea.   Breasts:  Negative for tenderness and discharge.   Genitourinary:  Negative for dysuria, frequency, genital sores, hematuria, pelvic pain, urgency, vaginal bleeding and vaginal discharge.   Musculoskeletal:  Negative for arthralgias, joint swelling  "and myalgias.   Skin:  Negative for rash.   Neurological:  Negative for dizziness, weakness, headaches and paresthesias.   Psychiatric/Behavioral:  Negative for mood changes. The patient is not nervous/anxious.      OBJECTIVE:   /80   Pulse 78   Temp 97.4  F (36.3  C)   Resp 18   Ht 1.676 m (5' 6\")   Wt 64 kg (141 lb)   LMP 05/18/2012   SpO2 95%   BMI 22.76 kg/m    EXAM:  GENERAL: healthy, alert and no distress  EYES: Eyes grossly normal to inspection, PERRL and conjunctivae and sclerae normal  HENT: ear canals and TM's normal, nose and mouth without ulcers or lesions  NECK: no adenopathy, no asymmetry, masses, or scars and thyroid normal to palpation  RESP: lungs clear to auscultation - no rales, rhonchi or wheezes  CV: regular rate and rhythm, normal S1 S2, no S3 or S4, no murmur, click or rub, no peripheral edema and peripheral pulses strong  ABDOMEN: soft, nontender, no hepatosplenomegaly, no masses and bowel sounds normal  MS: no gross musculoskeletal defects noted, no edema  SKIN: no suspicious lesions or rashes  NEURO: Normal strength and tone, mentation intact and speech normal  PSYCH: mentation appears normal, affect normal/bright  LYMPH: no cervical, supraclavicular, axillary, or inguinal adenopathy  BREAST: declined exam   (female): declined exam      ASSESSMENT/PLAN:   Routine general medical examination at a health care facility      Screening for diabetes mellitus  - Comprehensive metabolic panel    Screening, deficiency anemia, iron  - CBC with platelets    Screening for lipid disorders  - Lipid panel reflex to direct LDL Fasting    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    COUNSELING:   Reviewed preventive health counseling, as reflected in patient instructions     reports that she has never smoked. She has never used smokeless tobacco.      No follow-ups on file.    Follow-up Visit   Expected date: Dec 15, 2023      Follow Up Appointment Details:     " Follow-up with whom?: Other Primary Care Services    Follow-Up for what?: Lab Visit    How?: In Person             Follow-up Visit   Expected date:  Dec 15, 2024 (Approximate)      Follow Up Appointment Details:     Follow-up with whom?: PCP    Follow-Up for what?: Adult Preventive    How?: In Person                         Hector Samuel MD     [address] [address]  [email or web address]     Office: [phone]

## 2023-12-27 ENCOUNTER — LAB (OUTPATIENT)
Dept: LAB | Facility: CLINIC | Age: 63
End: 2023-12-27
Payer: COMMERCIAL

## 2023-12-27 DIAGNOSIS — Z13.0 SCREENING, DEFICIENCY ANEMIA, IRON: ICD-10-CM

## 2023-12-27 DIAGNOSIS — Z13.1 SCREENING FOR DIABETES MELLITUS: ICD-10-CM

## 2023-12-27 DIAGNOSIS — Z13.220 SCREENING FOR LIPID DISORDERS: ICD-10-CM

## 2023-12-27 LAB
ALBUMIN SERPL BCG-MCNC: 4.4 G/DL (ref 3.5–5.2)
ALP SERPL-CCNC: 98 U/L (ref 40–150)
ALT SERPL W P-5'-P-CCNC: 9 U/L (ref 0–50)
ANION GAP SERPL CALCULATED.3IONS-SCNC: 10 MMOL/L (ref 7–15)
AST SERPL W P-5'-P-CCNC: 16 U/L (ref 0–45)
BILIRUB SERPL-MCNC: 0.5 MG/DL
BUN SERPL-MCNC: 9.7 MG/DL (ref 8–23)
CALCIUM SERPL-MCNC: 9.5 MG/DL (ref 8.8–10.2)
CHLORIDE SERPL-SCNC: 104 MMOL/L (ref 98–107)
CHOLEST SERPL-MCNC: 219 MG/DL
CREAT SERPL-MCNC: 0.84 MG/DL (ref 0.51–0.95)
DEPRECATED HCO3 PLAS-SCNC: 25 MMOL/L (ref 22–29)
EGFRCR SERPLBLD CKD-EPI 2021: 77 ML/MIN/1.73M2
ERYTHROCYTE [DISTWIDTH] IN BLOOD BY AUTOMATED COUNT: 12.5 % (ref 10–15)
FASTING STATUS PATIENT QL REPORTED: YES
GLUCOSE SERPL-MCNC: 94 MG/DL (ref 70–99)
HCT VFR BLD AUTO: 43.6 % (ref 35–47)
HDLC SERPL-MCNC: 63 MG/DL
HGB BLD-MCNC: 14.8 G/DL (ref 11.7–15.7)
LDLC SERPL CALC-MCNC: 135 MG/DL
MCH RBC QN AUTO: 31.2 PG (ref 26.5–33)
MCHC RBC AUTO-ENTMCNC: 33.9 G/DL (ref 31.5–36.5)
MCV RBC AUTO: 92 FL (ref 78–100)
NONHDLC SERPL-MCNC: 156 MG/DL
PLATELET # BLD AUTO: 195 10E3/UL (ref 150–450)
POTASSIUM SERPL-SCNC: 4.5 MMOL/L (ref 3.4–5.3)
PROT SERPL-MCNC: 7.3 G/DL (ref 6.4–8.3)
RBC # BLD AUTO: 4.75 10E6/UL (ref 3.8–5.2)
SODIUM SERPL-SCNC: 139 MMOL/L (ref 135–145)
TRIGL SERPL-MCNC: 106 MG/DL
WBC # BLD AUTO: 5.6 10E3/UL (ref 4–11)

## 2023-12-27 PROCEDURE — 80053 COMPREHEN METABOLIC PANEL: CPT

## 2023-12-27 PROCEDURE — 36415 COLL VENOUS BLD VENIPUNCTURE: CPT

## 2023-12-27 PROCEDURE — 80061 LIPID PANEL: CPT

## 2023-12-27 PROCEDURE — 85027 COMPLETE CBC AUTOMATED: CPT

## 2023-12-29 NOTE — RESULT ENCOUNTER NOTE
Dear Shira,    Here is a summary of your recent test results:  -Normal red blood cell (hgb) levels, normal white blood cell count and normal platelet levels.  -Lipid panel: The ASCVD risk score returns the percentage likelihood of a first time Atherosclerotic Cardiovascular Disease (ASCVD) event.    The 10-year ASCVD risk score (Teresita MCKEE, et al., 2019) is: 3.6%    Values used to calculate the score:      Age: 63 years      Sex: Female      Is Non- : No      Diabetic: No      Tobacco smoker: No      Systolic Blood Pressure: 108 mmHg      Is BP treated: No      HDL Cholesterol: 63 mg/dL      Total Cholesterol: 219 mg/dL    -3.6% of patients that have a similar cholesterol profile to you will have a stroke, heart attack or death (related to heart disease) within the next 10 years and that is considered a low risk and cholesterol lowering medications are not  recommended at this time (high risk is >10%, or >7.5% if other risk factors such has high blood pressure or other heart disease risk factors).    -LDL(bad) cholesterol level is elevated which can increase your heart disease risk.  A diet high in fat and simple carbohydrates, genetics and being overweight can contribute to this. ADVISE: exercising 150 minutes of aerobic exercise per week (30 minutes for 5 days per week or 50 minutes for 3 days per week are options) and eating a low saturated fat/low carbohydrate diet are helpful to improve this.  -Liver and gallbladder tests are normal (ALT,AST, Alk phos, bilirubin), kidney function is normal (Cr, GFR), sodium is normal, potassium is normal, calcium is normal, glucose is normal.    For additional lab test information, www.[email or web address].com is a very good reference.    In addition, here is a list of due or overdue Health Maintenance reminders:  Diptheria Tetanus Pertussis (DTAP/TDAP/TD) Vaccine(1 - Tdap) Never done  Mammogram due on 12/16/2023    Please call us at [phone] (or use [employer]) to  address the above recommendations if needed.           Thank you very much for trusting me and [facility].     Have a peaceful day.    Healthy regards,  Hector Samuel MD

## 2024-04-15 ENCOUNTER — TELEPHONE (OUTPATIENT)
Dept: FAMILY MEDICINE | Facility: CLINIC | Age: 64
End: 2024-04-15
Payer: COMMERCIAL

## 2024-04-15 DIAGNOSIS — D17.30 LIPOMA OF SKIN AND SUBCUTANEOUS TISSUE: Primary | ICD-10-CM

## 2024-04-15 NOTE — TELEPHONE ENCOUNTER
She had lipoma noted on scan and this would be handled by general surgery. Will refer there and someone from scheduling should be calling.   Per Dr Samuel

## 2024-04-15 NOTE — TELEPHONE ENCOUNTER
She had lipoma noted on scan and this would be handled by general surgery.  Will refer there and someone from scheduling should be calling.

## 2024-04-15 NOTE — TELEPHONE ENCOUNTER
FYI - Status Update    Who is Calling: patient    Update: Needs scans of Ankle sent over to Dermatology, would like to get issue removed, its getting bigger.     Does caller want a call/response back: Yes     Could we send this information to you in [employer] or would you prefer to receive a phone call?:   Patient would prefer a phone call   Okay to leave a detailed message?: Yes at Cell number on file:    Telephone Information:   Mobile [phone]

## 2024-04-15 NOTE — TELEPHONE ENCOUNTER
Order/Referral Request    Who is requesting: Pt is calling for a referral - Needs scans of Ankle sent over to Dermatology, would like to get issue removed, its getting bigger.  She stated she mentioned this In last appt 12/15/23.  Its getting worse so would like to get this taken care of.  Willing  to sign  a consent to send this over to dermatology.    Orders being requested: Dermatology to removed on ankle    Reason service is needed/diagnosis: above    When are orders needed by: asap    Has this been discussed with Provider: Yes    Does patient have a preference on a Group/Provider/Facility? Above - whomever provider refers    Does patient have an appointment scheduled?: No    Where to send orders:  Dermatology clinic that is recommended    Could we [facility] this information [facility] you [facility] or would you prefer to receive a phone call?:   Patient would prefer a phone call   Okay to leave a detailed message?: Yes at Cell number on file:    Telephone Information:   Mobile [phone]     Cassie BOWDEN

## 2024-04-16 NOTE — TELEPHONE ENCOUNTER
Placed call to patient to advise of PCPs message below. Patient reports she recieved a call from them and will go ahead and schedule. Closing encounter.

## 2024-04-23 ENCOUNTER — TELEPHONE (OUTPATIENT)
Dept: FAMILY MEDICINE | Facility: CLINIC | Age: 64
End: 2024-04-23
Payer: COMMERCIAL

## 2024-04-23 DIAGNOSIS — D17.30 LIPOMA OF SKIN AND SUBCUTANEOUS TISSUE: Primary | ICD-10-CM

## 2024-04-23 NOTE — TELEPHONE ENCOUNTER
FYI - Status Update    Who is Calling: patient    Update: PT CALLED TO UPDATE ON GENERAL SURGERY REFERRAL, PT TRIED TO SCHEDULE AND THEY SAID THEY DO NOT PERFORM ANYTHING FOR BELOW THE KNEE. PT WOULD LIKE TO KNOW NEXT STEPS, PT IS WONDERING IF DERMATOLOGY WOULD BE ABLE TO TREAT HER.     Does caller want a call/response back: Yes     Could we send this information to you in [employer] or would you prefer to receive a phone call?:   Patient would prefer a phone call   Okay to leave a detailed message?: Yes at Cell number on file:    Telephone Information:   Mobile [phone]

## 2024-05-01 ENCOUNTER — TELEPHONE (OUTPATIENT)
Dept: ORTHOPEDICS | Facility: CLINIC | Age: 64
End: 2024-05-01
Payer: COMMERCIAL

## 2024-05-01 NOTE — TELEPHONE ENCOUNTER
What is the Concern:  Tumor  Date the concern started: uncertain  Injury related? no  Is this related to recent surgery?no  Laceration?  No  Body part affected:  below ankle  Has Patient been evaluated for condition? Yes, by primary care w/[facility]   Location the patient was evaluated and treated for the condition?   Primary Care, Location [place]  Who is referring provider, (name and clinic location) Dr. Carlito Samuel  What images have been done? MRI; Location and City where images were taken:     Could we send this information to you in [employer] or would you prefer to receive a phone call?:   Patient would prefer a phone call   Okay to leave a detailed message?: Yes at Cell number on file:    Telephone Information:   Mobile [phone]

## 2024-05-03 NOTE — TELEPHONE ENCOUNTER
VM left requesting a return call to schedule an appointment with an orthopedic oncologist.       Writer has documented on paper that I left a VM on 5/2 as well, but must have forgotten to note in chart.    Suzanne Mckinney LPN

## 2024-05-06 NOTE — TELEPHONE ENCOUNTER
Writer playing telephone tag with patient. Appointment put on hold for her to see Dr. Pillai on 5/10 at 11:00. Requested a return call to verify.    Suzanne Mckinney LPN

## 2024-05-06 NOTE — TELEPHONE ENCOUNTER
VM left requesting a return call to schedule an appointment with an orthopedic oncologist.    Suzanne Mckinney LPN

## 2024-05-17 NOTE — TELEPHONE ENCOUNTER
DIAGNOSIS: RIGHT LOWER LEG   Lipoma of skin and subcutaneous tissue [D17.30]  - Primary   APPOINTMENT DATE: 05/21/2024   NOTES STATUS DETAILS   OFFICE NOTE from referring provider Internal 12/15/2023 - Carlito Samuel MD - [facility]   OFFICE NOTE from other specialist Internal 02/25/2022- [facility]   MRI PACS Internal   XRAYS (IMAGES & REPORTS) PACS Internal

## 2024-05-21 ENCOUNTER — PRE VISIT (OUTPATIENT)
Dept: ORTHOPEDICS | Facility: CLINIC | Age: 64
End: 2024-05-21

## 2024-05-28 ENCOUNTER — TELEPHONE (OUTPATIENT)
Dept: ORTHOPEDICS | Facility: CLINIC | Age: 64
End: 2024-05-28

## 2024-05-28 ENCOUNTER — VIRTUAL VISIT (OUTPATIENT)
Dept: ORTHOPEDICS | Facility: CLINIC | Age: 64
End: 2024-05-28
Attending: FAMILY MEDICINE
Payer: COMMERCIAL

## 2024-05-28 VITALS — BODY MASS INDEX: 22.13 KG/M2 | WEIGHT: 141 LBS | HEIGHT: 67 IN

## 2024-05-28 DIAGNOSIS — D17.30 LIPOMA OF SKIN AND SUBCUTANEOUS TISSUE: Primary | ICD-10-CM

## 2024-05-28 PROCEDURE — 99203 OFFICE O/P NEW LOW 30 MIN: CPT | Mod: 95 | Performed by: ORTHOPAEDIC SURGERY

## 2024-05-28 ASSESSMENT — PAIN SCALES - GENERAL: PAINLEVEL: MODERATE PAIN (5)

## 2024-05-28 NOTE — TELEPHONE ENCOUNTER
----- Message from Tara Holter, RN sent at 5/28/2024  4:25 PM CDT -----  Regarding: Clohisy virtual follow up  Please call patient to schedule in person visit in July.    Thanks!  Savita MCGILL

## 2024-05-28 NOTE — NURSING NOTE
Is the patient currently in the state of [place]? YES    Visit mode:VIDEO    If the visit is dropped, the patient can be reconnected by: VIDEO VISIT: Text to cell phone:   Telephone Information:   Mobile [phone]       Will anyone else be joining the visit? NO  (If patient encounters technical issues they should call [phone]    How would you like to obtain your AVS? MyChart    Are changes needed to the allergy or medication list? Yes Pt states taking N-Acetyl Cysteine (NAC) as needed and Biosalts as needed.     Are refills needed on medications prescribed by this physician? NO    Reason for visit: Consult    Emery KHAN

## 2024-05-28 NOTE — TELEPHONE ENCOUNTER
Left Voicemail (1st Attempt) and Sent Mychart (1st Attempt) for the patient to call back and schedule the following:    Appointment type: P Return tumor  Provider: Dr. Pozo  Return date: July 2024

## 2024-05-28 NOTE — LETTER
5/28/2024       RE: Shira Li  [address]  [place] [facility] [id number]    Dear Colleague,    Thank you for referring your patient, Shira Li, to the [facility] [place]. Please see a copy of my visit note below.    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  MRI does not show any clear neoplasm.    Plan: Miles to contact the patient to set up a face-to-face visit in July.    Patient is a 63-year-old female who gives a history of a enlargement or swelling of the right ankle in the lateral aspect.  She reports she has pain in the area by the end of the day and feels that the swelling or mass is enlarging.  She mentioned this to her primary care physician.  He thought it was a lipoma and ordered an MRI scan.  The MRI scan showed redundant subcutaneous tissue but no clear delineation of a subcutaneous lipoma.  She is referred to see us for further evaluation.    The the patient reports as stated above the mass is enlarging towards the end of the day.  She feels it on the lateral aspect of her right ankle.    I reviewed 2 MRI scans.  There was a marker included and neither scan shows clear evidence of a discrete lipoma though there is significant and likely redundant subcutaneous tissue.    I explained to Shira that it is best for me to perform a physical examination and discussed with her my findings as in some cases though not obvious on MRI subcutaneous lipomas can be detected on physical exam.  We will therefore proceed as outlined above.    This visit began at 3:49 PM at 3:48 PM.  The total length of the visit was 8 minutes.    Scar Pozo MD

## 2024-05-28 NOTE — PROGRESS NOTES
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  MRI does not show any clear neoplasm.    Plan: Miles to contact the patient to set up a face-to-face visit in July.    Patient is a 63-year-old female who gives a history of a enlargement or swelling of the right ankle in the lateral aspect.  She reports she has pain in the area by the end of the day and feels that the swelling or mass is enlarging.  She mentioned this to her primary care physician.  He thought it was a lipoma and ordered an MRI scan.  The MRI scan showed redundant subcutaneous tissue but no clear delineation of a subcutaneous lipoma.  She is referred to see us for further evaluation.    The the patient reports as stated above the mass is enlarging towards the end of the day.  She feels it on the lateral aspect of her right ankle.    I reviewed 2 MRI scans.  There was a marker included and neither scan shows clear evidence of a discrete lipoma though there is significant and likely redundant subcutaneous tissue.    I explained to Shira that it is best for me to perform a physical examination and discussed with her my findings as in some cases though not obvious on MRI subcutaneous lipomas can be detected on physical exam.  We will therefore proceed as outlined above.    This visit began at 3:49 PM at 3:48 PM.  The total length of the visit was 8 minutes.

## 2024-05-28 NOTE — PATIENT INSTRUCTIONS
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  MRI does not show any clear neoplasm.    Plan: Miles to contact the patient to set up a face-to-face visit in July.

## 2024-05-30 NOTE — TELEPHONE ENCOUNTER
Patient confirmed scheduled appointment:  Date: 8/8/24  Time: 1:30  Visit type: UMP return tumor  Provider: Dr. Pozo

## 2024-06-13 ENCOUNTER — PATIENT OUTREACH (OUTPATIENT)
Dept: CARE COORDINATION | Facility: CLINIC | Age: 64
End: 2024-06-13
Payer: COMMERCIAL

## 2024-06-23 ENCOUNTER — HEALTH MAINTENANCE LETTER (OUTPATIENT)
Age: 64
End: 2024-06-23

## 2024-08-22 ENCOUNTER — TELEPHONE (OUTPATIENT)
Dept: ORTHOPEDICS | Facility: CLINIC | Age: 64
End: 2024-08-22
Payer: COMMERCIAL

## 2024-08-22 NOTE — TELEPHONE ENCOUNTER
Writer called and spoke with patient regarding cancelled appointment earlier in the month. Patient had sighted insurance/financial issues as the reason for cancellation.     Patient states she does not feel the appointment necessary as provider told her it was a fatty deposit. She states she did not think insurance would cover an elective surgery.     Writer told patient I would ask Dr. Pozo's nurse call patient to discuss if appointment was needed and why.    Suzanne Mckinney LPN

## 2024-08-23 ENCOUNTER — TELEPHONE (OUTPATIENT)
Dept: FAMILY MEDICINE | Facility: CLINIC | Age: 64
End: 2024-08-23
Payer: COMMERCIAL

## 2024-08-23 NOTE — TELEPHONE ENCOUNTER
Reason for Call:  Appointment Request    Patient requesting this type of appt:  office visit    Requested provider: Carlito Samuel    Reason patient unable to be scheduled: Not within requested timeframe    When does patient want to be seen/preferred time: 3-7 days    Comments: Patient is experiencing similar symptoms to when she had the gallstones. She has pain 1-2 hours after eating. She had been infected by lysteria, but that has cleared up. She would like to discuss getting tested for h.pylori and gallbladder function.    Could we send this information to you in "[facility]" or would you prefer to receive a phone call?:   Patient would prefer a phone call   Okay to leave a detailed message?: Yes at Cell number on file:    Telephone Information:   Mobile [phone]       Call taken on 8/23/2024 at 3:05 PM by Nayeli Franz

## 2024-08-26 ENCOUNTER — OFFICE VISIT (OUTPATIENT)
Dept: FAMILY MEDICINE | Facility: CLINIC | Age: 64
End: 2024-08-26
Payer: COMMERCIAL

## 2024-08-26 ENCOUNTER — NURSE TRIAGE (OUTPATIENT)
Dept: FAMILY MEDICINE | Facility: CLINIC | Age: 64
End: 2024-08-26

## 2024-08-26 VITALS
HEIGHT: 67 IN | HEART RATE: 117 BPM | BODY MASS INDEX: 21.31 KG/M2 | WEIGHT: 135.8 LBS | TEMPERATURE: 97.6 F | OXYGEN SATURATION: 95 % | RESPIRATION RATE: 16 BRPM | DIASTOLIC BLOOD PRESSURE: 70 MMHG | SYSTOLIC BLOOD PRESSURE: 110 MMHG

## 2024-08-26 DIAGNOSIS — R10.13 EPIGASTRIC PAIN: Primary | ICD-10-CM

## 2024-08-26 DIAGNOSIS — K80.20 GALLSTONES: ICD-10-CM

## 2024-08-26 DIAGNOSIS — R10.11 RUQ ABDOMINAL PAIN: ICD-10-CM

## 2024-08-26 DIAGNOSIS — Z87.19 HISTORY OF GALLSTONES: ICD-10-CM

## 2024-08-26 DIAGNOSIS — E88.89 CYTOCHROME P450 2C19 ENZYME DEFICIENCY (H): ICD-10-CM

## 2024-08-26 LAB
BASOPHILS # BLD AUTO: 0 10E3/UL (ref 0–0.2)
BASOPHILS NFR BLD AUTO: 1 %
EOSINOPHIL # BLD AUTO: 0.2 10E3/UL (ref 0–0.7)
EOSINOPHIL NFR BLD AUTO: 4 %
ERYTHROCYTE [DISTWIDTH] IN BLOOD BY AUTOMATED COUNT: 12.7 % (ref 10–15)
ERYTHROCYTE [SEDIMENTATION RATE] IN BLOOD BY WESTERGREN METHOD: 6 MM/HR (ref 0–30)
HCT VFR BLD AUTO: 45.3 % (ref 35–47)
HGB BLD-MCNC: 15.3 G/DL (ref 11.7–15.7)
IMM GRANULOCYTES # BLD: 0 10E3/UL
IMM GRANULOCYTES NFR BLD: 0 %
LYMPHOCYTES # BLD AUTO: 2.5 10E3/UL (ref 0.8–5.3)
LYMPHOCYTES NFR BLD AUTO: 45 %
MCH RBC QN AUTO: 30.2 PG (ref 26.5–33)
MCHC RBC AUTO-ENTMCNC: 33.8 G/DL (ref 31.5–36.5)
MCV RBC AUTO: 89 FL (ref 78–100)
MONOCYTES # BLD AUTO: 0.5 10E3/UL (ref 0–1.3)
MONOCYTES NFR BLD AUTO: 8 %
NEUTROPHILS # BLD AUTO: 2.4 10E3/UL (ref 1.6–8.3)
NEUTROPHILS NFR BLD AUTO: 42 %
PLATELET # BLD AUTO: 195 10E3/UL (ref 150–450)
RBC # BLD AUTO: 5.07 10E6/UL (ref 3.8–5.2)
WBC # BLD AUTO: 5.6 10E3/UL (ref 4–11)

## 2024-08-26 PROCEDURE — 36415 COLL VENOUS BLD VENIPUNCTURE: CPT | Performed by: NURSE PRACTITIONER

## 2024-08-26 PROCEDURE — 86140 C-REACTIVE PROTEIN: CPT | Performed by: NURSE PRACTITIONER

## 2024-08-26 PROCEDURE — 82150 ASSAY OF AMYLASE: CPT | Performed by: NURSE PRACTITIONER

## 2024-08-26 PROCEDURE — 99214 OFFICE O/P EST MOD 30 MIN: CPT | Performed by: NURSE PRACTITIONER

## 2024-08-26 PROCEDURE — 85652 RBC SED RATE AUTOMATED: CPT | Performed by: NURSE PRACTITIONER

## 2024-08-26 PROCEDURE — 83690 ASSAY OF LIPASE: CPT | Performed by: NURSE PRACTITIONER

## 2024-08-26 PROCEDURE — 85025 COMPLETE CBC W/AUTO DIFF WBC: CPT | Performed by: NURSE PRACTITIONER

## 2024-08-26 PROCEDURE — 80053 COMPREHEN METABOLIC PANEL: CPT | Performed by: NURSE PRACTITIONER

## 2024-08-26 PROCEDURE — G2211 COMPLEX E/M VISIT ADD ON: HCPCS | Performed by: NURSE PRACTITIONER

## 2024-08-26 RX ORDER — SUCRALFATE 1 G/1
1 TABLET ORAL 4 TIMES DAILY PRN
Qty: 40 TABLET | Refills: 1 | Status: SHIPPED | OUTPATIENT
Start: 2024-08-26

## 2024-08-26 RX ORDER — SODIUM BICARBONATE 650 MG/1
650 TABLET ORAL PRN
COMMUNITY

## 2024-08-26 NOTE — TELEPHONE ENCOUNTER
"Provider requested patient is triaged before today's virtual apt.     Apt note   Lg doses of sodium bicarbonate - seems to help? whole family was [employer] - ended up w/expolosive racquel - whole family affected. Patient is experiencing similar symptoms to when she had the gallstones. She has pain 1-2 hours after eating. She had been infected by lysteria, but that has cleared up. She would like to discuss getting tested for h.pylori and gallbladder function.     Background   Gallstones 4 years ago   Long covid per patient.   Gallstones and h pylori and sodium bicarb helps.   They were eating bores head meat every day stopped around 8/12 when she saw the news about bores head meat       Assessment   Abdominal pain started a couple weeks ago that is the same as previous gallstone pain     taking digestive and bile salts      is having symptoms now.     Fatigue   About 8 lb weight loss      About 45 minutes after eating the moderate pain starts in her upper central abdominal pain    rubber bad like feeling tight \" it feels like a knot\"  Only thing that takes the pain is sodium bicarbonate.   About 10 tablets.   Each meal for the last 2 weeks.     8/12-8/18 Throwing up and diarrhea after eating the bores head meat.    Denies nausea, vomiting, fever, diarrhea     Recommendations   Advised will talk with provider and let patient know if to be seen in person or virtual today      Reason for Disposition   Pain lasting > 10 minutes and over 50 years old   Patient wants to be seen   MODERATE pain that comes and goes (cramps) lasts > 24 hours   MILD pain that comes and goes (cramps) > 72 hours  (Exception: This same abdominal pain is a chronic symptom recurrent or ongoing AND present > 4 weeks.)    Additional Information   Negative: SEVERE difficulty breathing (e.g., struggling for each breath, speaks in single words)   Negative: Shock suspected (e.g., cold/pale/clammy skin, too weak to stand, low BP, rapid " pulse)   Negative: Difficult to awaken or acting confused (e.g., disoriented, slurred speech)   Negative: Passed out (i.e., lost consciousness, collapsed and was not responding)   Negative: Visible sweat on face or sweat is dripping down   Negative: Sounds like a life-threatening emergency to the triager   Negative: Followed an abdomen (stomach) injury   Negative: Chest pain   Negative: Abdominal pain and pregnant < 20 weeks   Negative: Abdominal pain and pregnant 20 or more weeks   Negative: Abdomen bloating or swelling are main symptoms   Negative: SEVERE abdominal pain (e.g., excruciating)   Negative: Pain lasting > 10 minutes and over 40 years old and associated chest, arm, neck, upper back, or jaw pain   Negative: Pain lasting > 10 minutes and over 35 years old and at least one cardiac risk factor (e.g., diabetes, high cholesterol, hypertension, obesity, smoker or strong family history of heart disease)   Negative: Pain lasting > 10 minutes and history of heart disease (i.e., heart attack, bypass surgery, angina, angioplasty, CHF)   Negative: Pain lasts > 10 minutes and difficulty breathing   Negative: Vomiting red blood or black (coffee ground) material  (Exception: Few streaks and only occurred once.)   Negative: Blood in bowel movements  (Exception: Blood on surface of BM with constipation.)   Negative: Black or tarry bowel movements  (Exception: Chronic-unchanged black-grey BMs AND is taking iron pills or Pepto-Bismol.)   Negative: Pregnant 20 weeks or more and new hand or face swelling   Negative: Vomiting bile (green color)   Negative: Fever > 103 F (39.4 C)   Negative: Fever > 101 F (38.3 C) and over 60 years of age   Negative: Fever > 100.0 F (37.8 C) and has diabetes mellitus or a weak immune system (e.g., HIV positive, cancer chemotherapy, organ transplant, splenectomy, chronic steroids)   Negative: Fever > 100.0 F (37.8 C) and bedridden (e.g., CVA, chronic illness, recovering from surgery)    Negative: White of the eyes have turned yellow (i.e., jaundice)   Negative: Vomiting and abdomen looks much more swollen than usual   Negative: MILD TO MODERATE pain and not relieved by antacid medicine   Negative: Patient sounds very sick or weak to the triager   Negative: MILD TO MODERATE constant pain lasting > 2 hours    Protocols used: Abdominal Pain - Upper-A-OH

## 2024-08-26 NOTE — PROGRESS NOTES
Assessment & Plan     Epigastric pain  Reassuring exam no higher level of care felt to be needed.    History of gallstones should rule this out versus other.   Labs and US.  Carafate prn.   Low fat diet.  CT abdomen pelvis if not cause found on US.  Red flag symptoms discussed and if these occur present to the emergency room or call 911.   - Helicobacter pylori Antigen Stool  - US Abdomen Limited  - Comprehensive metabolic panel (BMP + Alb, Alk Phos, ALT, AST, Total. Bili, TP)  - ESR: Erythrocyte sedimentation rate  - CRP, inflammation  - CBC with platelets and differential  - Lipase  - Amylase  - sucralfate (CARAFATE) 1 GM tablet  Dispense: 40 tablet; Refill: 1  - Helicobacter pylori Antigen Stool  - Comprehensive metabolic panel (BMP + Alb, Alk Phos, ALT, AST, Total. Bili, TP)  - ESR: Erythrocyte sedimentation rate  - CRP, inflammation  - CBC with platelets and differential  - Lipase  - Amylase    RUQ abdominal pain    - US Abdomen Limited  - Comprehensive metabolic panel (BMP + Alb, Alk Phos, ALT, AST, Total. Bili, TP)  - ESR: Erythrocyte sedimentation rate  - CRP, inflammation  - Comprehensive metabolic panel (BMP + Alb, Alk Phos, ALT, AST, Total. 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     Amparo Silva is a 63 year old, presenting for the following health issues:  Abdominal Pain        8/26/2024     2:14 PM   Additional Questions   Roomed by Cassie BRIANNA   Accompanied by Self     History of Present Illness       Reason for visit:  GI Issues gallbadder check H Pyloria check  Symptom onset:  More than a month   She is taking medications regularly.       Triage Note from 8/26/2024  Provider requested patient is triaged before today's virtual apt.      Apt note   Lg doses of sodium  "bicarbonate - seems to help? whole family was [employer] - ended up w/expolosive racquel - whole family affected. Patient is experiencing similar symptoms to when she had the gallstones. She has pain 1-2 hours after eating. She had been infected by lysteria, but that has cleared up. She would like to discuss getting tested for h.pylori and gallbladder function.      Background   Gallstones 4 years ago   Long covid per patient.   Gallstones and h pylori and sodium bicarb helps.   They were eating [employer] meat every day stopped around 8/12 when she saw the news about [employer]         Assessment   Abdominal pain started a couple weeks ago that is the same as previous gallstone pain      taking digestive and bile salts       is having symptoms now.      Fatigue   About 8 lb weight loss       About 45 minutes after eating the moderate pain starts in her upper central abdominal pain    rubber bad like feeling tight \" it feels like a knot\"  Only thing that takes the pain is sodium bicarbonate.   About 10 tablets.   Each meal for the last 2 weeks.      8/12-8/18 Throwing up and diarrhea after eating the [employer] head meat.     Denies nausea, vomiting, fever, diarrhea      Recommendations   Advised will talk with provider and let patient know if to be seen in person or virtual today        Reason for Disposition   Pain lasting > 10 minutes and over 50 years old   Patient wants to be seen   MODERATE pain that comes and goes (cramps) lasts > 24 hours   MILD pain that comes and goes (cramps) > 72 hours  (Exception: This same abdominal pain is a chronic symptom recurrent or ongoing AND present > 4 weeks.)    Additional Information   Negative: SEVERE difficulty breathing (e.g., struggling for each breath, speaks in single words)   Negative: Shock suspected (e.g., cold/pale/clammy skin, too weak to stand, low BP, rapid pulse)   Negative: Difficult to awaken or acting confused (e.g., disoriented, slurred " speech)   Negative: Passed out (i.e., lost consciousness, collapsed and was not responding)   Negative: Visible sweat on face or sweat is dripping down   Negative: Sounds like a life-threatening emergency to the triager   Negative: Followed an abdomen (stomach) injury   Negative: Chest pain   Negative: Abdominal pain and pregnant < 20 weeks   Negative: Abdominal pain and pregnant 20 or more weeks   Negative: Abdomen bloating or swelling are main symptoms   Negative: SEVERE abdominal pain (e.g., excruciating)   Negative: Pain lasting > 10 minutes and over 40 years old and associated chest, arm, neck, upper back, or jaw pain   Negative: Pain lasting > 10 minutes and over 35 years old and at least one cardiac risk factor (e.g., diabetes, high cholesterol, hypertension, obesity, smoker or strong family history of heart disease)   Negative: Pain lasting > 10 minutes and history of heart disease (i.e., heart attack, bypass surgery, angina, angioplasty, CHF)   Negative: Pain lasts > 10 minutes and difficulty breathing   Negative: Vomiting red blood or black (coffee ground) material  (Exception: Few streaks and only occurred once.)   Negative: Blood in bowel movements  (Exception: Blood on surface of BM with constipation.)   Negative: Black or tarry bowel movements  (Exception: Chronic-unchanged black-grey BMs AND is taking iron pills or Pepto-Bismol.)   Negative: Pregnant 20 weeks or more and new hand or face swelling   Negative: Vomiting bile (green color)   Negative: Fever > 103 F (39.4 C)   Negative: Fever > 101 F (38.3 C) and over 60 years of age   Negative: Fever > 100.0 F (37.8 C) and has diabetes mellitus or a weak immune system (e.g., HIV positive, cancer chemotherapy, organ transplant, splenectomy, chronic steroids)   Negative: Fever > 100.0 F (37.8 C) and bedridden (e.g., CVA, chronic illness, recovering from surgery)   Negative: White of the eyes have turned yellow (i.e., jaundice)   Negative: Vomiting and  "abdomen looks much more swollen than usual   Negative: MILD TO MODERATE pain and not relieved by antacid medicine   Negative: Patient sounds very sick or weak to the triager   Negative: MILD TO MODERATE constant pain lasting > 2 hours    Protocols used: Abdominal Pain - Upper-A-OH         looking for patterns    Boars head meats; explosive diarrhea.     Digestive enzymes digest assure and bile acid factors   Lemon steep lemon water  Battery acid  Unexplained weight loss.  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        Wt Readings from Last 5 Encounters:   08/26/24 61.6 kg (135 lb 12.8 oz)   05/28/24 64 kg (141 lb)   12/15/23 64 kg (141 lb)   09/05/23 65.8 kg (145 lb)   06/26/23 70.8 kg (156 lb)        Burping after food.  Epigastric pain and rubber band feeling.   having similar symptoms.   Number of years ago 3 small gallstones surgeon said didn't have to remove.    Constitutional, HEENT, cardiovascular, pulmonary, GI, , musculoskeletal, neuro, skin, endocrine and psych systems are negative, except as otherwise noted in the HPI.         Objective    /70   Pulse 117   Temp 97.6  F (36.4  C) (Tympanic)   Resp 16   Ht 1.689 m (5' 6.5\")   Wt 61.6 kg (135 lb 12.8 oz)   LMP 05/18/2012   SpO2 95%   BMI 21.59 kg/m    Body mass index is 21.59 kg/m .  Physical Exam   GENERAL: alert and no distress  NECK: no adenopathy, no asymmetry, masses, or scars  RESP: lungs clear to auscultation - no rales, rhonchi or wheezes  CV: regular rate and rhythm, normal S1 S2, no S3 or S4, no murmur, click or rub, no peripheral edema  ABDOMEN: soft, nontender, no hepatosplenomegaly, no masses and bowel sounds normal  MS: no gross musculoskeletal defects noted, no edema         Signed Electronically by: YSABEL Watson CNP    "

## 2024-08-26 NOTE — TELEPHONE ENCOUNTER
Writer spoke with patient telling her that per Isabel Hernandez it was OK for her to follow up and needed. I encouraged her to give us a call if any further issues develop.    Suzanne Mckinney LPN

## 2024-08-26 NOTE — TELEPHONE ENCOUNTER
Huddled with Vianca lam.     Keep apt in person.     Called patient 210 pm arrival time - in person.

## 2024-08-26 NOTE — LETTER
September 3, 2024      Shira Tejada Betsycristaljannette  [address]  [place] [address]        Dear Ms.Gee Li,    We are writing to inform you of your test results.    All of your labs are normal.      For additional lab test information, [email or web address] is an excellent reference.     Resulted Orders   ESR: Erythrocyte sedimentation rate   Result Value Ref Range    Erythrocyte Sedimentation Rate 6 0 - 30 mm/hr   CRP, inflammation   Result Value Ref Range    CRP Inflammation <3.00 <5.00 mg/L   Lipase   Result Value Ref Range    Lipase 25 13 - 60 U/L   Amylase   Result Value Ref Range    Amylase 59 28 - 100 U/L   CBC with platelets and differential   Result Value Ref Range    WBC Count 5.6 4.0 - 11.0 10e3/uL    RBC Count 5.07 3.80 - 5.20 10e6/uL    Hemoglobin 15.3 11.7 - 15.7 g/dL    Hematocrit 45.3 35.0 - 47.0 %    MCV 89 78 - 100 fL    MCH 30.2 26.5 - 33.0 pg    MCHC 33.8 31.5 - 36.5 g/dL    RDW 12.7 10.0 - 15.0 %    Platelet Count 195 150 - 450 10e3/uL    % Neutrophils 42 %    % Lymphocytes 45 %    % Monocytes 8 %    % Eosinophils 4 %    % Basophils 1 %    % Immature Granulocytes 0 %    Absolute Neutrophils 2.4 1.6 - 8.3 10e3/uL    Absolute Lymphocytes 2.5 0.8 - 5.3 10e3/uL    Absolute Monocytes 0.5 0.0 - 1.3 10e3/uL    Absolute Eosinophils 0.2 0.0 - 0.7 10e3/uL    Absolute Basophils 0.0 0.0 - 0.2 10e3/uL    Absolute Immature Granulocytes 0.0 <=0.4 10e3/uL       If you have any questions or concerns, please call the clinic at the number listed above.       Sincerely,        CRISTIANO Watson-BC

## 2024-08-26 NOTE — TELEPHONE ENCOUNTER
Scheduled appt - whole Family affected by the [employer] deal, where they all had diarahea (severe) and pt is still having symptoms - like gall bladder.    Closed encounter.    Cassie BOWDEN

## 2024-08-27 ENCOUNTER — APPOINTMENT (OUTPATIENT)
Dept: LAB | Facility: CLINIC | Age: 64
End: 2024-08-27
Payer: COMMERCIAL

## 2024-08-27 LAB
ALBUMIN SERPL BCG-MCNC: 4.5 G/DL (ref 3.5–5.2)
ALP SERPL-CCNC: 101 U/L (ref 40–150)
ALT SERPL W P-5'-P-CCNC: 12 U/L (ref 0–50)
AMYLASE SERPL-CCNC: 59 U/L (ref 28–100)
ANION GAP SERPL CALCULATED.3IONS-SCNC: 13 MMOL/L (ref 7–15)
AST SERPL W P-5'-P-CCNC: 17 U/L (ref 0–45)
BILIRUB SERPL-MCNC: 0.5 MG/DL
BUN SERPL-MCNC: 15.7 MG/DL (ref 8–23)
CALCIUM SERPL-MCNC: 9.5 MG/DL (ref 8.8–10.4)
CHLORIDE SERPL-SCNC: 104 MMOL/L (ref 98–107)
CREAT SERPL-MCNC: 0.89 MG/DL (ref 0.51–0.95)
CRP SERPL-MCNC: <3 MG/L
EGFRCR SERPLBLD CKD-EPI 2021: 72 ML/MIN/1.73M2
GLUCOSE SERPL-MCNC: 91 MG/DL (ref 70–99)
HCO3 SERPL-SCNC: 23 MMOL/L (ref 22–29)
LIPASE SERPL-CCNC: 25 U/L (ref 13–60)
POTASSIUM SERPL-SCNC: 4.6 MMOL/L (ref 3.4–5.3)
PROT SERPL-MCNC: 7.4 G/DL (ref 6.4–8.3)
SODIUM SERPL-SCNC: 140 MMOL/L (ref 135–145)

## 2024-08-27 PROCEDURE — 87338 HPYLORI STOOL AG IA: CPT | Performed by: NURSE PRACTITIONER

## 2024-08-29 ENCOUNTER — HOSPITAL ENCOUNTER (OUTPATIENT)
Dept: ULTRASOUND IMAGING | Facility: CLINIC | Age: 64
Discharge: HOME | End: 2024-08-29
Attending: NURSE PRACTITIONER | Admitting: NURSE PRACTITIONER
Payer: COMMERCIAL

## 2024-08-29 DIAGNOSIS — R10.13 EPIGASTRIC PAIN: ICD-10-CM

## 2024-08-29 DIAGNOSIS — R10.11 RUQ ABDOMINAL PAIN: ICD-10-CM

## 2024-08-29 DIAGNOSIS — Z87.19 HISTORY OF GALLSTONES: ICD-10-CM

## 2024-08-29 LAB — H PYLORI AG STL QL IA: NEGATIVE

## 2024-08-29 PROCEDURE — 76705 ECHO EXAM OF ABDOMEN: CPT

## 2024-08-29 NOTE — RESULT ENCOUNTER NOTE
Dear Shira,    Here is a summary of your recent test results:    All of your labs are normal.    For additional lab test information, [email or web address] is an excellent reference.    In addition, here is a list of due or overdue Health Maintenance reminders:    Diptheria Tetanus Pertussis (DTAP/TDAP/TD) Vaccine(1 - Tdap) Never done  Zoster (Shingles) Vaccine(1 of 2) Never done  RSV VACCINE(1 - 1-dose 60+ series) Never done  COVID-19 Vaccine(5 - 2023-24 season) due on 09/01/2023  Mammogram due on 12/16/2023    Please call us at [phone] (or use [employer]) to address the above recommendations if needed.    Thank you for choosing [facility].  It was an honor and a privilege to participate in your care.       Healthy regards,    Jennifer Goodman, CRISTIANO  [facility]

## 2024-08-30 ENCOUNTER — TELEPHONE (OUTPATIENT)
Dept: FAMILY MEDICINE | Facility: CLINIC | Age: 64
End: 2024-08-30
Payer: COMMERCIAL

## 2024-08-30 NOTE — RESULT ENCOUNTER NOTE
Dear Shira,    Here is a summary of your recent test results:    Your gallbladder ultrasound does show gallstones without a current inflammation/infection.  Gallstones can cause numerous symptoms including stomach upset after fatty type foods bloating distention diarrhea nausea and vomiting sometimes.  If it is actively blocking a bile duct it can cause significant pain fevers etc. I would recommend consultation with a surgeon to discuss removal so that you do not have to have this taken out urgently if it becomes an emergent situation. I have sent this referral.      IMPRESSION:  1.  Cholelithiasis without cholecystitis.      For additional lab test information, [email or web address] is an excellent reference.    In addition, here is a list of due or overdue Health Maintenance reminders:    Diptheria Tetanus Pertussis (DTAP/TDAP/TD) Vaccine(1 - Tdap) Never done  Zoster (Shingles) Vaccine(1 of 2) Never done  RSV VACCINE(1 - 1-dose 60+ series) Never done  COVID-19 Vaccine(5 - 2023-24 season) due on 09/01/2023  Mammogram due on 12/16/2023    Please call us at [phone] (or use [employer]) to address the above recommendations if needed.    Thank you for choosing  [facility].  It was an honor and a privilege to participate in your care.       Healthy regards,    Jennifer Goodman, CRISTIANO  [facility]

## 2024-08-30 NOTE — TELEPHONE ENCOUNTER
Test Results        Who ordered the test:  Jennifer Goodman    Type of test: Ultrasound    Date of test:  8/29/2024    Where was the test performed:  [facility]    What are your questions/concerns?:  Pt was wondering how many Gallbladder stones she has and if they have gotten worse since her last US which was 5 years ago.    Could we send this information to you in 20:20 Mobile or would you prefer to receive a phone call?:   Patient would prefer a phone call   Okay to leave a detailed message?: Yes at Cell number on file:    Telephone Information:   Mobile [phone]

## 2024-09-03 NOTE — RESULT ENCOUNTER NOTE
Note to Staff: please send a result letter    Dear Shira,    Here is a summary of your recent test results:    All of your labs are normal.    For additional lab test information, [email or web address] is an excellent reference.    In addition, here is a list of due or overdue Health Maintenance reminders:    Diptheria Tetanus Pertussis (DTAP/TDAP/TD) Vaccine(1 - Tdap) Never done  Zoster (Shingles) Vaccine(1 of 2) Never done  RSV VACCINE(1 - 1-dose 60+ series) Never done  COVID-19 Vaccine(5 - 2023-24 season) due on 09/01/2023  Mammogram due on 12/16/2023    Please call us at [phone] (or use [employer]) to address the above recommendations if needed.    Thank you for choosing [facility].  It was an honor and a privilege to participate in your care.      Healthy regards,    Jennifer Goodman, CRISTIANO  [facility]

## 2024-09-04 NOTE — TELEPHONE ENCOUNTER
Called patient and reviewed  result note from 8/29      Patient is wondering if she still has the same 3 gallstones or does she have more.     The patient would like to know the # of gallstones.   Explained report States filled with sludge and stones, largest 2.0cm     she is willing to follow through with the GI consult.   Gave Surgery Consult scheduling [phone]

## 2024-09-11 ENCOUNTER — OFFICE VISIT (OUTPATIENT)
Dept: SURGERY | Facility: CLINIC | Age: 64
End: 2024-09-11
Payer: COMMERCIAL

## 2024-09-11 ENCOUNTER — HOSPITAL ENCOUNTER (OUTPATIENT)
Facility: CLINIC | Age: 64
End: 2024-09-11
Attending: STUDENT IN AN ORGANIZED HEALTH CARE EDUCATION/TRAINING PROGRAM | Admitting: STUDENT IN AN ORGANIZED HEALTH CARE EDUCATION/TRAINING PROGRAM
Payer: COMMERCIAL

## 2024-09-11 VITALS
DIASTOLIC BLOOD PRESSURE: 66 MMHG | RESPIRATION RATE: 16 BRPM | HEART RATE: 85 BPM | HEIGHT: 66 IN | WEIGHT: 135 LBS | BODY MASS INDEX: 21.69 KG/M2 | SYSTOLIC BLOOD PRESSURE: 114 MMHG | OXYGEN SATURATION: 96 %

## 2024-09-11 DIAGNOSIS — R10.13 EPIGASTRIC PAIN: ICD-10-CM

## 2024-09-11 DIAGNOSIS — R10.11 RUQ ABDOMINAL PAIN: ICD-10-CM

## 2024-09-11 DIAGNOSIS — K80.20 GALLSTONES: ICD-10-CM

## 2024-09-11 PROCEDURE — 99204 OFFICE O/P NEW MOD 45 MIN: CPT | Performed by: STUDENT IN AN ORGANIZED HEALTH CARE EDUCATION/TRAINING PROGRAM

## 2024-09-11 RX ORDER — VITAMIN B COMPLEX
TABLET ORAL DAILY
COMMUNITY

## 2024-09-11 RX ORDER — INDOCYANINE GREEN AND WATER 25 MG
1.5 KIT INJECTION ONCE
OUTPATIENT
Start: 2024-09-11 | End: 2024-09-11

## 2024-09-11 NOTE — LETTER
2024       Shira Li  [address]  [place] [address]     RE: [id number]  : 1960    Shira Li has been scheduled for surgery on 24 at 9:40 am at [facility] with Dr Carlito Lira.  The hospital is located at [address] in [place].    Please check in at the Surgery reception desk at 7:40 am. This is located in the back of the hospital on the East side, just past the Emergency Room entrance.     DO NOT EAT OR DRINK ANYTHING 8 HOURS BEFORE YOUR ARRIVAL TIME.   You may have sips of clear liquids up until 2 hours before your arrival time. If you have been advised to take your medication, please do this early in the morning with just sips of clear liquid.       Hospital regulations require an updated pre-operative examination to be completed within 30 days of the procedure. This can be done by your primary care provider. Please ask them to fax documentation to [phone]. We also recommend you bring a copy with you.     You should shower before your surgery with Hibiclens or Exidine soap.  This can be found at your local pharmacy or you can pick it up from our office for free.  Please call our office if you have any questions.     You will be required to have an Adult (friend or family member) drive you home after your surgery and arrange for an adult to stay with you until the next morning.     You will receive several calls from our staff 3-7 days prior to your scheduled procedure with further details and to answer any questions you may have.    It is sometimes necessary to adjust the surgery schedule due to emergencies and additions to the schedule.  If your surgery is affected by this, we greatly appreciate your flexibility and understanding in this matter    It is best if you call regarding post-operative questions between the hours of 8:00 am & 3:00 pm Monday-Friday, so you have access to the daytime care  team that know you best.  Prescription refills are accepted during regular office hours only.    Please do not bring any Disability or FMLA papers to the hospital.  They need to be either faxed ([phone]), mailed or hand delivered to our office by you or a family member for completion.  Please allow 14 business days to complete paperwork.        If you have questions or concerns, please contact our office at [phone].

## 2024-09-11 NOTE — LETTER
Showering Before Surgery      Your surgeon has asked you to take 2 showers before surgery.    Why is this important?  It is normal for bacteria (germs) to be on your skin. The skin protects us from these germs. When you have surgery, we cut the skin. Sometimes germs get into the cuts and cause infection (illness caused by germs). By following the instructions below and using special soap, you will lower the number of germs on your skin. This decreases your chance of infection.  Special soap  Buy or get 8 ounces of antiseptic surgical soap called 4% CHG. Common name brands of this soap are Hibiclens and Exidine.  You can find it at your local pharmacy, clinic or retail store. If you have trouble, ask your pharmacist to help you find the right substitute.  A note about shaving:  Do not shave within 12 inches of your incision (surgical cut) area for at least 3 days before surgery. Shaving can make small cuts in the skin. This puts you at a higher risk of infection.  Items you will need for each shower:  1 newly washed towel  4 ounces of one of the above soaps  Clean pajamas or clothes to change into    Follow these instructions:  Follow these steps the evening before surgery and the morning of surgery.  Wash your hair and body with your regular shampoo and soap. Make sure you rinse the shampoo and soap from your hair and body.  Using clean hands, apply about 2 ounces of soap gently on your skin from your ear lobes to your toes. Use on your groin area last. Do not use this soap on your face or head. If you get any soap in your eyes, ears or mouth, rinse right away.  Repeat step 2. It is very important to let the soap stay on your skin for at least 1 minute.    Rinse well and dry off using a clean towel.    If you feel any tingling, itching or other irritation, rinse right away. It is normal to feel some coolness on the skin after using the antiseptic soap. Your skin may feel a bit dry after the shower, but do not use  any lotions, creams or moisturizers. Do not use hair spray or other products in your hair.  5.  Dress in freshly washed clothes or pajamas. Use fresh pillowcases and sheets on your bed.    Repeat these steps the morning of surgery.  If you have any questions about showering or an allergy to CHG soap, please call your surgery center.    For informational purposes only. Not to replace the advice of your health care provider. Copyright   2012 [facility]. All rights reserved. Clinically reviewed by Infection Prevention [employer] Practice [employer] Education. [employer] [id number] - REV 12

## 2024-09-12 ENCOUNTER — TELEPHONE (OUTPATIENT)
Dept: SURGERY | Facility: CLINIC | Age: 64
End: 2024-09-12
Payer: COMMERCIAL

## 2024-09-12 NOTE — TELEPHONE ENCOUNTER
CANCELED  Type of surgery: ROBOTIC ASSISTED LAPAROSCOPIC CHOLECYSTECTOMY   Location of surgery: [facility]  Date and time of surgery: 11-19-24, 9:40 AM  Surgeon: DR CEBALLOS   Pre-Op Appt Date: PATIENT TO SCHEDULE   Post-Op Appt Date: NA   Packet sent out:  GIVEN TO PATIENT   Pre-cert/Authorization completed:  Not Applicable  Date: 9-12-24        ROBOTIC ASSISTED LAPAROSCOPIC CHOLECYSTECTOMY GENERAL PT INST TO HAVE H&P 75 MINS REQ PA ASSIST RTC ALW

## 2024-09-14 NOTE — PROGRESS NOTES
[facility]  New Patient Office Visit      Shira Li is a 63 year old female seen in consultation for Abdominal pain, epigastric at the request of Carlito Samuel MD.       Assessment and Plan:  It is my impression that Ms Terrell Li has symptomatic cholelithiasis and possibly chronic cholecystitis   I have offered her a robotic assisted laparoscopic cholecystectomy.      We have discussed the indication, alternatives, risks and expected recovery.  Specifically we have discussed incisions, scarring, postoperative infections, anesthesia, bleeding, open conversion, common bile duct injury, injury to intra-abdominal organs, bile leak, DVT, PE, hernia, post cholecystectomy diarrhea, postoperative dietary restrictions and physical limitations.  We have discussed the recommended interventions and treatments for these complications.  All questions have been answered to the best of my ability.           We will schedule surgery at the patient's convenience.  Needs a preop H&P to be performed by PCP.        Chief complaint:  Abdominal pain, epigastric    HPI:  Shira Li is a 63 year old female who presents for evaluation of epigastric abdominal pain. She has had these attacks of pain for several years now but she had a recent attack in August that was severe. She has a known history of gallstones dating back to at least 2019. She tried bile pills and changed her diet which did work for some time but now with this new attack of pain she got another US to see if the gallstones were still there. She denies any abdominal surgical history.     Her  recently was diagnosed with stomach cancer and her son has Autism which makes undergoing surgery difficult for her at this time but she is planning on trying to find some time in the near future.     Past Medical History:  Past Medical History:   Diagnosis Date    Broken arm     as a child     CARDIOVASCULAR SCREENING; LDL GOAL LESS  "THAN 130     MUNOZ (dyspnea on exertion)     Fibula fracture     as a child     Hollenhorst plaque, left eye        Past Surgical History:  Past Surgical History:   Procedure Laterality Date    HEART CATH LEFT HEART CATH  03-06-15    Angiographic normal coronary arteries     ORTHOPEDIC SURGERY  1967    leg fracture        Social History:  Social History     Tobacco Use    Smoking status: Never     Passive exposure: Past    Smokeless tobacco: Never   Substance Use Topics    Alcohol use: Yes     Comment: 1 drink per month    Drug use: No        Family History:  Family History   Problem Relation Age of Onset    Diabetes Mother          at age 30 yrs    C.A.D. Father 48        stents and CABG    Lipids Father     Coronary Artery Disease Father         MI    Allergy (Severe) Father         very allergic to medication    Cerebrovascular Disease Maternal Grandmother     Myocardial Infarction Maternal Grandmother         multiple MI's     Arthritis Maternal Grandmother         RA    Hypertension Maternal Grandmother     Coronary Artery Disease Maternal Grandfather         MI    Coronary Artery Disease Paternal Grandmother     Coronary Artery Disease Paternal Grandfather     Other - See Comments Son         Superior Semicircular canal dehiscense syndrome    Psychotic Disorder Son         Asbergers     Autism Spectrum Disorder Son        Review of Systems:  The 10 point review of systems is negative other than noted in the HPI and above.    Physical Exam:  Vitals: /66   Pulse 85   Resp 16   Ht 1.676 m (5' 6\")   Wt 61.2 kg (135 lb)   LMP 2012   SpO2 96%   BMI 21.79 kg/m    BMI= Body mass index is 21.79 kg/m .  General - Well developed, well nourished female in no apparent distress  Abdomen: not distended   Neurologic: alert, speech is clear, nonfocal  Psychiatric: Mood and affect appropriate    Relevant labs:    WBC -   Lab Results   Component Value Date    WBC 5.6 2024       HgB -   Lab Results "   Component Value Date    HGB 15.3 08/26/2024       Plt-   Lab Results   Component Value Date     08/26/2024       Liver Function Studies -   Recent Labs   Lab Test 08/26/24  1532   PROTTOTAL 7.4   ALBUMIN 4.5   BILITOTAL 0.5   ALKPHOS 101   AST 17   ALT 12       Lipase-   Lab Results   Component Value Date    LIPASE 25 08/26/2024           Imaging:  US from 2024 and US from 2019 reviewed by me with growth in the gallstones noted on the US that now are about 2 cm in size and fill almost the entire gallbladder     Recent Results (from the past 744 hour(s))   US Abdomen Limited    Narrative    US ABDOMEN LIMITED 8/29/2024 2:31 PM    CLINICAL HISTORY: Rule out gallbladder; Epigastric pain; RUQ abdominal  pain; History of gallstones.    TECHNIQUE: Limited abdominal ultrasound.    COMPARISON: May 8, 2019    FINDINGS:    GALLBLADDER: Cholelithiasis without cholecystitis.    BILE DUCTS: There is no biliary dilatation. The common duct measures 2  mm.    LIVER: Unremarkable where seen.    RIGHT KIDNEY: No hydronephrosis.    PANCREAS: The visualized portions of the pancreas are normal.    No ascites.      Impression    IMPRESSION:  1.  Cholelithiasis without cholecystitis.    DENEEN BRANNON MD         SYSTEM ID:  [id number]         Carlito Lira MD  [facility], [place]    Please route or send letter to:  Primary Care Provider (PCP)

## 2024-09-27 NOTE — TELEPHONE ENCOUNTER
REFERRAL INFORMATION:  Referring Provider: Dr. Lira  Referring Clinic: [facility] - General Surgery  Reason for Visit/Diagnosis: Gallbladder Issues       FUTURE VISIT INFORMATION:  Appointment Date: 10/7/2024  Appointment Time: 7:30 AM     NOTES RECORD STATUS  DETAILS   OFFICE NOTE from Referring Provider Internal [facility]:  9/11/24 - GEN SURG OV with Dr. Lira   OFFICE NOTE from Other Specialists Internal [facility]:  8/26/24 - PCC OV with Jennifer Goodman NP    [facility]:  9/13/19, 6/3/19 - GEN SURG OV with Dr. Saniya Waller  [facility]:  6/20/19 - UC OV with Dr. Salmeron   [facility] DISCHARGE SUMMARY/ ED VISITS  N/A    OPERATIVE REPORT Internal [facility]:  11/19/24 - OP Note for CHOLECYSTECTOMY, ROBOT-ASSISTED, LAPAROSCOPIC, USING DA SERINA XI with Dr. Lira   PERTINENT LABS Internal    IMAGING (CT, MRI, US, XR)  Internal [facility]:  8/29/24, 5/8/19 - US Abdomen

## 2024-10-04 ENCOUNTER — PRE VISIT (OUTPATIENT)
Dept: SURGERY | Facility: CLINIC | Age: 64
End: 2024-10-04
Payer: COMMERCIAL

## 2024-10-04 NOTE — TELEPHONE ENCOUNTER
Patient Telephone Reminder Call    Date of call:  10/04/24  Phone numbers:  Home number on file [phone] (home)    Reached patient/confirmed appointment:  Yes  Appointment with:   Dr. Miguelangel June  Reason for visit:  Cholelithiasis  Remind patient that this visit is a consultation only, NO procedure:  Yes

## 2024-10-07 ENCOUNTER — VIRTUAL VISIT (OUTPATIENT)
Dept: SURGERY | Facility: CLINIC | Age: 64
End: 2024-10-07
Payer: COMMERCIAL

## 2024-10-07 ENCOUNTER — PRE VISIT (OUTPATIENT)
Dept: SURGERY | Facility: CLINIC | Age: 64
End: 2024-10-07

## 2024-10-07 VITALS — HEIGHT: 67 IN | WEIGHT: 130 LBS | BODY MASS INDEX: 20.4 KG/M2

## 2024-10-07 DIAGNOSIS — K80.20 GALLSTONES: Primary | ICD-10-CM

## 2024-10-07 PROCEDURE — 99203 OFFICE O/P NEW LOW 30 MIN: CPT | Mod: 95 | Performed by: SURGERY

## 2024-10-07 RX ORDER — INDOCYANINE GREEN AND WATER 25 MG
2.5 KIT INJECTION ONCE
Status: CANCELLED | OUTPATIENT
Start: 2024-10-07 | End: 2024-10-07

## 2024-10-07 RX ORDER — CEFAZOLIN SODIUM 2 G/50ML
2 SOLUTION INTRAVENOUS
Status: CANCELLED | OUTPATIENT
Start: 2024-10-07

## 2024-10-07 RX ORDER — CEFAZOLIN SODIUM 2 G/50ML
2 SOLUTION INTRAVENOUS SEE ADMIN INSTRUCTIONS
Status: CANCELLED | OUTPATIENT
Start: 2024-10-07

## 2024-10-07 ASSESSMENT — PAIN SCALES - GENERAL: PAINLEVEL: NO PAIN (0)

## 2024-10-07 NOTE — NURSING NOTE
[employer] message sent to the patient with surgical logistics and post op teaching information for review.  The patient was asked to contact this office with any questions or if they would like to review this information over the telephone.

## 2024-10-07 NOTE — NURSING NOTE
"Chief Complaint   Patient presents with    New Patient     Cholelithiasis       Vitals:    10/07/24 0710   Weight: 59 kg (130 lb)   Height: 1.689 m (5' 6.5\")       Body mass index is 20.67 kg/m .                          Wayne Pineda, EMT    "

## 2024-10-07 NOTE — PATIENT INSTRUCTIONS
You met with Dr. Miguelangel June.      Today's visit instructions:    Reminder:  Surgery Requirements  Your surgery will be at [address] [place] [address].  You will need to arrive 2 hours early.  You will need someone to drive you home (over 18 years old) and stay with you for 24 hours after the procedure.  You will need a preop physical with [facility] ([facility]) within 30 days of surgery- closer is always better.  Stop any blood thinners, vitamins, minerals, or herbal supplements 5 days before surgery.  If you are taking a prescribed blood thinner or weight loss medication please let us know for specific instructions.  Fasting- a nurse from [facility] will call you 1-2 days before surgery to confirm your procedure and tell you when to stop eating and drinking.   Wash with Hibiclens (can be purchased at any pharmacy) or or you can stop at any [facility] and they will give you a bottle to use  the night before surgery and morning of surgery. See instructions in the Surgery Packet.  If you would like a procedure estimate please call Cost of Care at [phone] during the hours of 8 AM - 3 PM (option #2 for on-line request and option #3 for a representative).     If you have questions please contact Renetta RN or Deb RN during regular clinic hours, Monday through Friday 7:30 AM - 4:00 PM, or you can contact us via [email or web address] at anytime.       If you have urgent needs after-hours, weekends, or holidays please call the hospital at [phone] and ask to speak with our on-call General Surgery Team.    Appointment [phone]  Nurse Advice (Renetta or Deb): [phone]   Surgery Scheduler (Rayne): [phone]  Fax: [phone]    After your Laparoscopic Cholecystectomy          Incision care   You may take a shower the day after surgery. Carefully wash your incision with soap and water. Do not submerge yourself in water (bath, whirlpool, hot tub, pool, lake) for 14 days after  surgery.   Remove the bandage 1-2 days after surgery but leave the medical tape (Steri-Strips) or glue in place. These will loosen and fall off on their own 1-2 weeks after surgery.     Always wash your hands before touching your incisions or removing bandages.   It is not unusual to form a collection of fluid or blood under your incision that may feel firm or squishy- it can take several weeks to months for your body to reabsorb it.  At times, it may even drain.  If that should happen keep the area clean with soap, water,  and cover with a clean gauze dressing. You can change this daily or as needed.       Other medicines   Wait to start aspirin or blood thinners until the day after surgery. You can continue your regular medicines at your normal time the day after surgery.    Your pain medicine may cause constipation (hard, dry stools). To help with this, take the stool softener your doctor gave you or an over-the-counter stool softener or laxative. You can stop taking this when you are no longer taking pain medicine and your bowel movements are back to normal.      For pain or discomfort   Take the narcotic pain medicine your doctor gave you as needed and as instructed on the bottle. If you prefer to use over-the-counter medication, use acetaminophen (Tylenol) or ibuprofen (Advil, Motrin) as instructed on the box. Do not take Tylenol if it is in your narcotic pain medication.   Use an ice pack on your abdomen (belly) for 20 minutes at a time as needed for the first 24 hours. Be sure to protect your skin by putting a cloth between the ice pack and your skin.   After 24 hours you can switch to heat for 20 minutes as needed. Be sure to protect your skin by putting a cloth between the heat pack and your skin.   You may experience right shoulder pain after surgery which will go away 1-4 days after your procedure.  This is related to the gas that was used to inflate your abdomen, it gets trapped between your liver and  diaphragm.  Walk frequently and apply a heating pad (protecting your skin from the heating pad with a barrier such as a towel).       Activities   No driving until you feel it s safe to do so. Don t drive while taking narcotic pain medicine.   Don t lift anything heavier than 20 pounds for 3 to 4 weeks after surgery.      Special equipment   None     Diet   You can eat your regular meals after surgery.      When to call the doctor   Call your doctor if you have:   A fever above 101 F (38.3 C) (taken under the tongue), or a fever or chills lasting more than a day.   Redness at the incision site.   Any fluid or blood draining from the incision, especially if it smells bad.    Severe pain that doesn t improve with pain medicine.        We will call you 2 to 4 days after surgery to review this handout, answer questions and help arrange after-surgery care. If you have questions or concerns, please call [phone] during regular office hours. If you need to call after business hours, call [phone] and ask to page the surgeon on-call.     IMPORTANT:  Prior to your surgical procedure, a nurse will be contacting you to obtain a health history.   If they do not reach you by noon the day prior to your surgery, your surgery will be cancelled. If you have your Pre-Op Surgical Physical (H&P) with the Anesthesia Team (PAC), you are exempt from this call. Phone:  [phone] ([facility]) or [phone] ([facility]).         Transversus Abdominis Plane (TAP) Pain Block      What is a TAP block?   A TAP block can help you manage your pain after surgery. TAP stands for transversus abdominis plane, which is a muscle layer in your abdomen (belly). The TAP block uses numbing medicine similar to Novocaine to block pain near the site of your surgery.       Why get a TAP block?   To better manage your pain after surgery. A tap block will help keep the pain from getting severe and out of control.   To block pain signals from the nerve, which  helps decrease pain after surgery.   To help you sleep, easily breathe deeply, walk and visit with others.      How is it done?   You will lie still on a table. We will use an ultrasound machine to help us see the correct muscle layer of your abdomen. Then, we ll use a needle to inject the medicine. We may also give you some sleep medicine to lessen the pain of the injection.       The procedure takes between 5 and 15 minutes. It is usually done right before surgery, but will sometimes be after. It depends on your surgery and care needs.      What can I expect?   You may feel numbness, tingling or a heaviness in your abdomen.    You may have pain control up to 72 hours after surgery.   The TAP block may not lessen all of your surgery pain. But most patients feel 50 to 75 percent less pain than without the block.       Tell your nurse if you have:   Numbness or tingling in areas other than where the injection was   Blurry vision   Ringing in your ears   A metallic taste in your mouth

## 2024-10-07 NOTE — LETTER
10/7/2024       RE: Shira Li  [address]  [place] [facility] [address]     Dear Colleague,    Thank you for referring your patient, Shira Li, to the [facility] [place] [facility]. Please see a copy of my visit note below.    Shira Li is a 63 year old who is being evaluated via a billable video visit.      How would you like to obtain your AVS? MyChart  If the video visit is dropped, the invitation should be resent by: Text to cell phone: [phone]  Will anyone else be joining your video visit? No  If patient encounters technical issues they should call [phone]    During this virtual visit the patient is located in [place], patient verifies this as the location during the entirety of this visit.     Video-Visit Details  Video Start Time:  0729    Type of service:  Video Visit    Video End Time: 0751    Originating Location (pt. Location): Home    Distant Location (provider location):  On-site    Platform used for Video Visit: Estrellita      I spoke with Shira Li today for evaluation of her gallbladder.  She has an US showing gallstones in 2019 and another this year showing larger stones.    She has been trying dietary changes and some medications for abdominal discomfort (enzymes, sodium bicarb).    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        Without enzymes she gets epigastric pain.  Worse after eating something like pizza, though can show up without eating.  Feels like a rubber band around her upper abdomen.  Relief with sodium bicarb.    No history of pancreatitis or liver issues, no jaundice history    No prior abdominal surgery    PE:  Alert, pleasant  No resp distress  No jaundice    I reviewed her imaging- showing several large gallstones present    A/P:  Known cholelithiasis with upper abdominal pain.  Symptoms not classic for biliary  colic, though certainly consistent with intermittent biliary obstruction/colic.      We had a thorough discussion about the rationale behind cholecystectomy and the risks of the surgery. Discussed injury to the bile ducts and hepatic vessels, injury to colon, intestine, stomach, liver, pancreas, discussed pancreatitis, infection, bleeding and incisional hernia.  Discussed continued symptoms after surgery.    She would like to proceed.    Will have her see PACS clinic given this enzyme deficiency.      Plan for elective laparoscopic cholecystectomy      Again, thank you for allowing me to participate in the care of your patient.      Sincerely,    Miguelangel June MD

## 2024-10-07 NOTE — PROGRESS NOTES
Shira Li is a 63 year old who is being evaluated via a billable video visit.      How would you like to obtain your AVS? MyChart  If the video visit is dropped, the invitation should be resent by: Text to cell phone: [phone]  Will anyone else be joining your video visit? No  If patient encounters technical issues they should call [phone]    During this virtual visit the patient is located in [place], patient verifies this as the location during the entirety of this visit.     Video-Visit Details  Video Start Time:  0729    Type of service:  Video Visit    Video End Time: 0751    Originating Location (pt. Location): Home    Distant Location (provider location):  On-site    Platform used for Video Visit: Estrellita      I spoke with Shira Li today for evaluation of her gallbladder.  She has an US showing gallstones in 2019 and another this year showing larger stones.    She has been trying dietary changes and some medications for abdominal discomfort (enzymes, sodium bicarb).    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        Without enzymes she gets epigastric pain.  Worse after eating something like pizza, though can show up without eating.  Feels like a rubber band around her upper abdomen.  Relief with sodium bicarb.    No history of pancreatitis or liver issues, no jaundice history    No prior abdominal surgery    PE:  Alert, pleasant  No resp distress  No jaundice    I reviewed her imaging- showing several large gallstones present    A/P:  Known cholelithiasis with upper abdominal pain.  Symptoms not classic for biliary colic, though certainly consistent with intermittent biliary obstruction/colic.      We had a thorough discussion about the rationale behind cholecystectomy and the risks of the surgery. Discussed injury to the bile ducts and hepatic vessels, injury to colon, intestine, stomach, liver, pancreas, discussed pancreatitis, infection, bleeding and incisional  hernia.  Discussed continued symptoms after surgery.    She would like to proceed.    Will have her see PACS clinic given this enzyme deficiency.      Plan for elective laparoscopic cholecystectomy

## 2024-10-10 ENCOUNTER — PATIENT OUTREACH (OUTPATIENT)
Dept: CARE COORDINATION | Facility: CLINIC | Age: 64
End: 2024-10-10
Payer: COMMERCIAL

## 2024-10-10 ENCOUNTER — TELEPHONE (OUTPATIENT)
Dept: SURGERY | Facility: CLINIC | Age: 64
End: 2024-10-10
Payer: COMMERCIAL

## 2024-10-15 ENCOUNTER — TELEPHONE (OUTPATIENT)
Dept: SURGERY | Facility: CLINIC | Age: 64
End: 2024-10-15
Payer: COMMERCIAL

## 2024-10-15 NOTE — TELEPHONE ENCOUNTER
Patient called and LVM to schedule surgery with Dr. June.    Will route to .    Chelsey Shields on 10/15/2024 at 2:35 PM

## 2024-10-17 NOTE — TELEPHONE ENCOUNTER
Closing encounter to work out of other surgery scheduling encounter.    Chelsey Shields on 10/17/2024 at 9:06 AM

## 2024-10-21 NOTE — TELEPHONE ENCOUNTER
Called and LVM for patient to schedule surgery with Dr. June. Provided direct call back number [phone].      Chelsey Shields on 10/17/2024 at 9:21 AM      
Called patient to schedule surgery with Dr. June    Spoke with:  Patient    Date of Surgery: 11/8/24    Arrival time Discussed with Patient:  No    Location of surgery: [facility]     Pre-Op H&P: With PAC on 11/4/24    Post-Op Appts: Protocol     Discussed with patient PAC RN will provide arrival time and instructions for surgery at the time of the appointment: [[facility] only]: Yes    Packet sent out: Yes  via Mail - Standard [DATE] 10/21/24      Additional Comments:  N/A      All patients questions were answered and patient was instructed to review surgical packet and call back with any questions or concerns.       Chelsey Shields on 10/21/2024 at 4:02 PM    
Left voicemail for patient regarding scheduling surgery with Dr. June.    Provided contact number to discuss.    P: [phone]    __    Rayne Caro, on 10/10/2024 at 11:38 AM      
98.4

## 2024-10-22 NOTE — TELEPHONE ENCOUNTER
FUTURE VISIT INFORMATION      SURGERY INFORMATION:  Date: 11/8/24  Location: [facility] OR  Surgeon:  Miguelangel June MD   Anesthesia Type:  general  Procedure: laparoscopic cholecystectomy   Consult: [facility]    RECORDS REQUESTED FROM:       Primary Care Provider: Carlito Samuel MD - [facility]    Most recent PFT's: 6/26/23

## 2024-11-04 ENCOUNTER — VIRTUAL VISIT (OUTPATIENT)
Dept: SURGERY | Facility: CLINIC | Age: 64
End: 2024-11-04
Payer: COMMERCIAL

## 2024-11-04 ENCOUNTER — ANESTHESIA EVENT (OUTPATIENT)
Dept: SURGERY | Facility: CLINIC | Age: 64
End: 2024-11-04
Payer: COMMERCIAL

## 2024-11-04 ENCOUNTER — PRE VISIT (OUTPATIENT)
Dept: SURGERY | Facility: CLINIC | Age: 64
End: 2024-11-04

## 2024-11-04 VITALS — HEIGHT: 66 IN | BODY MASS INDEX: 21.69 KG/M2 | WEIGHT: 135 LBS

## 2024-11-04 DIAGNOSIS — Z01.818 PRE-OP EVALUATION: Primary | ICD-10-CM

## 2024-11-04 PROCEDURE — 99202 OFFICE O/P NEW SF 15 MIN: CPT | Mod: 95 | Performed by: PHYSICIAN ASSISTANT

## 2024-11-04 ASSESSMENT — LIFESTYLE VARIABLES: TOBACCO_USE: 0

## 2024-11-04 ASSESSMENT — PAIN SCALES - GENERAL: PAINLEVEL_OUTOF10: NO PAIN (0)

## 2024-11-04 ASSESSMENT — ENCOUNTER SYMPTOMS: SEIZURES: 0

## 2024-11-04 NOTE — PROGRESS NOTES
Shira is a 64 year old who is being evaluated via a billable video visit.      Cynthia Christensen   Shira is a 64 year old, presenting for the following health issues:  Pre-Op Exam    HUMAIRA Sol LPN

## 2024-11-04 NOTE — H&P
Pre-Operative H & P     CC:  Preoperative exam to assess for increased cardiopulmonary risk while undergoing surgery and anesthesia.    Date of Encounter: 11/4/2024  Primary Care Physician:  Carlito Samuel     Reason for visit:   Encounter Diagnosis   Name Primary?    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 Terrell Li has a history of US showing gallstones in 2019. She has been trying dietary changes and enzymes for abdominal discomfort She is now scheduled for the above procedure.     History is obtained from the patient and chart review    Hx of abnormal bleeding or anti-platelet use: denies    Menstrual history: Patient's last menstrual period was 05/18/2012.     Past Medical History  Past Medical History:   Diagnosis Date    Broken arm     as a child     CARDIOVASCULAR SCREENING; LDL GOAL LESS THAN 130     MUNOZ (dyspnea on exertion)     Fibula fracture     as a child     Hollenhorst plaque, left eye        Past Surgical History  Past Surgical History:   Procedure Laterality Date    COLONOSCOPY      HEART CATH LEFT HEART CATH  03/06/2015    Angiographic normal coronary arteries     ORTHOPEDIC SURGERY  01/01/1967    leg fracture        Prior to Admission Medications  Current Outpatient Medications   Medication Sig Dispense Refill    Acetylcysteine (NAC PO) Take by mouth as needed.      NONFORMULARY Take by mouth 3 times daily. Digest Assure- complete digestion formula.  Enzymes from vegetarian sources .      UNABLE TO FIND Take  1,000 mg by mouth as needed. MEDICATION NAME: Bile Acid      Multiple vitamin  s TABS  (Patient not taking: Reported on 11/4/2024) 30 tablet     Multiple Vitamins-Minerals (PRESERVISION AREDS 2) CAPS  (Patient not taking: Reported on 11/4/2024)      OVER-THE-COUNTER Take by mouth every morning. [employer]      UNABLE TO FIND Take 453 mg by mouth 2 times daily. MEDICATION NAME: Digest Assure      Vitamin D3 (VITAMIN D, CHOLECALCIFEROL,) 25 mcg (1000 units) tablet Take by mouth daily. 5000 units daily (Patient not taking: Reported on 11/4/2024)         Allergies  Allergies   Allergen Reactions    Acetaminophen Other (See Comments)     PST insufficency    Breathing problems    Sulfa Antibiotics Other (See Comments)     Headaches.    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     High incident of childhood leukemia and cancers in this area.    [facility].                  Social Drivers of Health     Financial Resource Strain: Low Risk  (12/15/2023)    Financial Resource Strain     Within the past 12 months, have you or your family members you live with been unable to get utilities (heat, electricity) when it was really needed?: No   Food Insecurity: Low Risk  (12/15/2023)    Food Insecurity     Within the past 12 months, did you worry that your food would run out before you got money to buy more?: No     Within the past 12 months, did the food you bought just not last and you didn t have money to get more?: No   Transportation Needs: Low Risk  (12/15/2023)    Transportation Needs     Within the past 12 months, has lack of transportation kept you from medical appointments, getting your medicines, non-medical meetings or appointments, work, or from getting things that you need?: No   Physical Activity: Not on file   Stress: Not on file   Social Connections: Not on file   Interpersonal Safety: Low Risk  (2024)    Interpersonal Safety     Do you feel physically and emotionally safe where you currently live?: Yes     Within the past 12 months, have you been hit, slapped, kicked or otherwise physically hurt by someone?: No     Within the past 12 months, have you been humiliated or emotionally abused in other ways by your partner or ex-partner?: No   Housing Stability: Low Risk  (12/15/2023)    Housing Stability     Do you have housing? : Yes     Are you worried about losing your housing?: No       Family History  Family History   Problem Relation Age of Onset    Diabetes Mother          at age 30 yrs    C.A.D. Father  48        stents and CABG    Lipids Father     Coronary Artery Disease Father         MI    Allergy (Severe) Father         very allergic to medication    Cerebrovascular Disease Maternal Grandmother     Myocardial Infarction Maternal Grandmother         multiple MI's     Arthritis Maternal Grandmother         RA    Hypertension Maternal Grandmother     Coronary Artery Disease Maternal Grandfather         MI    Coronary Artery Disease Paternal Grandmother     Coronary Artery Disease Paternal Grandfather     Other - See Comments Son         Superior Semicircular canal dehiscense syndrome    Psychotic Disorder Son         Asbergers     Autism Spectrum Disorder Son     Anesthesia Reaction No family hx of        Review of Systems  The complete review of systems is negative other than noted in the HPI or here.   Anesthesia Evaluation   Pt has had prior anesthetic.         ROS/MED HX  ENT/Pulmonary: Comment: Has had morning SOB since covid infection 2 years ago, resolved with NAC   (-) tobacco use   Neurologic:  - neg neurologic ROS  (-) no seizures and no CVA   Cardiovascular:     (+)  - -   -  - -                                 Previous cardiac testing   Echo: Date: Results:    Stress Test:  Date: 2015 Results:  Stress  Exercise was stopped due to dyspnea.  The patient exhibited no chest pain during exercise.  The patient exercised 2 minutes, 58 seconds.  Normal blood pressure response to exercise.  Target Heart Rate was achieved.  A treadmill exercise test according to the Andre protocol was performed.  A low workload was achieved.  No arrhythmia noted.  No ST-T changes but baseline artifact may reduce sensitivity.  The visual ejection fraction is estimated at 55-60%.  End-systolic dimensions may increase mildly with some increase in   contractility but worsening apical hypokinesis (lateral apical and   anteroapical).    ECG Reviewed:  Date: 2019 Results:  Sinus  Rhythm   Low voltage in limb leads.    -Left axis.    Cath:  Date: 2015 Results:  Conclusion:   1. Angiographic normal coronary arteries in a left dominant  circulation.  2. Normal left ventricular function. Estimated ejection fraction is  65% with a left ventricular end-diastolic pressure of 14.   (-) taking anticoagulants/antiplatelets   METS/Exercise Tolerance: 4 - Raking leaves, gardening Comment: Taking care of , can walk distances, ascend stairs. Some longstanding mild MUNOZ that has not worsened since last cardiac testing    Hematologic:  - neg hematologic  ROS  (-) history of blood clots and history of blood transfusion   Musculoskeletal:  - neg musculoskeletal ROS     GI/Hepatic:     (+)          cholecystitis/cholelithiasis,       (-) GERD   Renal/Genitourinary:  - neg Renal ROS     Endo:  - neg endo ROS  (-) chronic steroid usage   Psychiatric/Substance Use:       Infectious Disease:  - neg infectious disease ROS     Malignancy:  - neg malignancy ROS     Other:            Virtual visit -  No vitals were obtained    Physical Exam  Constitutional: Awake, alert, cooperative, no apparent distress, and appears stated age.  HENT: Normocephalic  Respiratory: non labored breathing   Neurologic: Awake, alert, oriented to name, place and time.   Neuropsychiatric: Calm, cooperative. Normal affect.      Prior Labs/Diagnostic Studies   All labs and imaging personally reviewed     Component      Latest Ref Rng 8/26/2024  3:32 PM   WBC      4.0 - 11.0 10e3/uL 5.6    RBC Count      3.80 - 5.20 10e6/uL 5.07    Hemoglobin      11.7 - 15.7 g/dL 15.3    Hematocrit      35.0 - 47.0 % 45.3    MCV      78 - 100 fL 89    MCH      26.5 - 33.0 pg 30.2    MCHC      31.5 - 36.5 g/dL 33.8    RDW      10.0 - 15.0 % 12.7    Platelet Count      150 - 450 10e3/uL 195    % Neutrophils      % 42    % Lymphocytes      % 45    % Monocytes      % 8    % Eosinophils      % 4    % Basophils      % 1    % Immature Granulocytes      % 0    Absolute Neutrophils      1.6 - 8.3 10e3/uL 2.4     Absolute Lymphocytes      0.8 - 5.3 10e3/uL 2.5    Absolute Monocytes      0.0 - 1.3 10e3/uL 0.5    Absolute Eosinophils      0.0 - 0.7 10e3/uL 0.2    Absolute Basophils      0.0 - 0.2 10e3/uL 0.0    Absolute Immature Granulocytes      <=0.4 10e3/uL 0.0    Sodium      135 - 145 mmol/L 140    Potassium      3.4 - 5.3 mmol/L 4.6    Carbon Dioxide (CO2)      22 - 29 mmol/L 23    Anion Gap      7 - 15 mmol/L 13    Urea Nitrogen      8.0 - 23.0 mg/dL 15.7    Creatinine      0.51 - 0.95 mg/dL 0.89    GFR Estimate      >60 mL/min/1.73m2 72    Calcium      8.8 - 10.4 mg/dL 9.5    Chloride      98 - 107 mmol/L 104    Glucose      70 - 99 mg/dL 91    Alkaline Phosphatase      40 - 150 U/L 101    AST      0 - 45 U/L 17    ALT      0 - 50 U/L 12    Protein Total      6.4 - 8.3 g/dL 7.4    Albumin      3.5 - 5.2 g/dL 4.5    Bilirubin Total      <=1.2 mg/dL 0.5          EKG/ stress test - if available please see in ROS above   No results found.      Latest Ref Rng & Units 6/26/2023    10:01 AM   PFT   FVC L 1.95    FEV1 L 1.10    FVC% % 63    FEV1% % 45          The patient's records and results personally reviewed by this provider.     Outside records reviewed from: Care Everywhere      Assessment    [facility] 64 [facility] female seen as a PAC referral for risk assessment and optimization for anesthesia.    Plan/Recommendations  Pt will be optimized for the proposed procedure.  See below for details on the assessment, risk, and preoperative recommendations    NEUROLOGY  - No history of TIA, CVA or seizure  -Post Op delirium risk factors:  No risk identified    ENT  - No current airway concerns.  Will need to be reassessed day of surgery.  Mallampati: Unable to assess  TM: Unable to assess    CARDIAC  - No history of CAD, Hypertension, and Afib  -denies  cardiac symptoms  - METS (Metabolic Equivalents)  Patient performs 4 or more METS exercise without symptoms             Total Score: 0      RCRI-Low risk:  "Class 2 0.9% complication rate             Total Score: 1    RCRI: High Risk Surgery        PULMONARY  ANTONY Low Risk             Total Score: 1    ANTONY: Over 50 ys old      - Denies asthma or inhaler use  - Tobacco History    History   Smoking Status    Never   Smokeless Tobacco    Never       GI  -gallstones with the above procedure planned   PONV High Risk  Total Score: 3           1 AN PONV: Pt is Female    1 AN PONV: Patient is not a current smoker    1 AN PONV: Intended Post Op Opioids        /RENAL  - Baseline Creatinine WNL    ENDOCRINE    - BMI: Estimated body mass index is 21.79 kg/m  as calculated from the following:    Height as of this encounter: 1.676 m (5' 6\").    Weight as of this encounter: 61.2 kg (135 lb).  Healthy Weight (BMI 18.5-24.9)  - No history of Diabetes Mellitus    HEME  VTE Low Risk 0.26%             Total Score: 1    VTE: Greater than 59 yrs old      - No history of abnormal bleeding or antiplatelet use.    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 Discussed with Dr. Medrano. Can effect metabolism of clopidogrel, anticonvulsants, PPI, antimalarial drugs, some antidepressants, diazepam. There have been some case reports of delay emergence with propofol. The responses are highly variable according to a study from the journal of anesthesia. Patient reports her son required increased sedation with a procedure in the past. She has not had issues with sedation for scopes. She has not required general anesthesia in the past.     Different anesthesia methods/types have been discussed with the patient, but they are aware that the final plan will be decided by the assigned anesthesia provider on the date of service.  Patient was discussed with Dr Medrano    The patient is optimized for their procedure. AVS with information on surgery time/arrival time, meds and NPO status " given by nursing staff. No further diagnostic testing indicated.    Please refer to the physical examination documented by the anesthesiologist in the anesthesia record on the day of surgery.    Video-Visit Details    Type of service:  Video Visit    Provider received verbal consent for a Video Visit from the patient? Yes     Originating Location (pt. Location): Home    Distant Location (provider location):  Off-site  Mode of Communication:  Video Conference via dateIITians  On the day of service:     Prep time: 10 minutes  Visit time: 11 minutes  Documentation time: 8 minutes  ------------------------------------------  Total time: 29 minutes      Julia Townsend PA-C  Preoperative Assessment Center  [facility]  [facility]  Phone: [phone]  Fax: [phone]

## 2024-11-04 NOTE — PATIENT INSTRUCTIONS
Preparing for Your Surgery      Name:  Shira Li   MRN:  [id number]   :  1960   Today's Date:  2024       Arriving for surgery:  Surgery date:  24  Arrival time:  11:10 am    Please come to:     [facility]    [address] [place]  [address]     The [facility] ([facility] [place] Patient/Visitor Ramp is at [address]. Patients and visitors who self-park will receive the reduced hospital parking rate. If the Patient /Visitor Ramp is full, please follow the signs to the [employer] car park located at the main hospital entrance.       parking is available (24 hours/ 7 days a week)      Discounted parking pass options are available for patients and visitors. They can be purchased at the Inside Secure desk at the main hospital entrance.     -  Stop at the security desk and they will direct surgery patients to Registration and the Surgery Check in and Family Lounge. [phone]        - If you need directions, a wheelchair or an escort please stop at the Information/security desk in the lobby.     What can I eat or drink?  -  You may eat and drink normally up to 8 hours prior to arrival time. (Until 3:10 am)  -  You may have clear liquids until 2 hours prior to arrival time. (Until 9:10 am)    Examples of clear liquids:  Water  Clear broth  Juices (apple, white grape, white cranberry  and cider) without pulp  Noncarbonated, powder based beverages  (lemonade and Marty-Aid)  Sodas (Sprite, 7-Up, ginger ale and seltzer)  Coffee or tea (without milk or cream)  Gatorade    -  No Alcohol or cannabis products for at least 24 hours before surgery.     Which medicines can I take?  Hold Aspirin for 7 days before surgery.   Hold Multivitamins for 7 days before surgery. Reports not currently taking.  Hold Supplements for 7 days before surgery. Hold Huperzine starting now if you have not  already, and hold until surgery.  Hold Ibuprofen (Advil, Motrin) for 1 day before surgery--unless otherwise directed by surgeon.  Hold Naproxen (Aleve) for 4 days before surgery.    -  DO NOT take these medications the day of surgery:  Bile Acid  Acetylcysteine  Digest Assure    -  PLEASE TAKE these medications the day of surgery:  Nothing to take    How do I prepare myself?  - Please take 2 showers (one the night prior to surgery and one the morning of surgery) using Scrubcare or Hibiclens soap.   You may use your own shampoo and conditioner. No other hair products.    Use this soap only from the neck to your toes. Avoid genital area    Leave the soap on your skin for one minute--then rinse thoroughly.   - Please remove all jewelry and body piercings.  - No lotions, deodorants or fragrance.  - No makeup or fingernail polish.   - Bring your ID and insurance card.    -If you use a CPAP machine, please bring the CPAP machine, tubing, and mask to hospital.    -If you have a Deep Brain Stimulator, Spinal Cord Stimulator, or any Neuro Stimulator device---you must bring the remote control to the hospital.      ALL PATIENTS GOING HOME THE SAME DAY OF SURGERY ARE REQUIRED TO HAVE A RESPONSIBLE ADULT TO DRIVE AND BE IN ATTENDANCE WITH THEM FOR 24 HOURS FOLLOWING SURGERY.    Covid testing policy as of 12/06/2022  Your surgeon will notify and schedule you for a COVID test if one is needed before surgery--please direct any questions or COVID symptoms to your surgeon      Questions or Concerns:    - For any questions regarding the day of surgery or your hospital stay, please contact the Pre Admission Nursing Office at [phone].       - If you have health changes between today and your surgery, please call your surgeon.       - For questions after surgery, please call your surgeons office.           Current Visitor Guidelines    You may have 2 visitors in the pre op area.    Visiting hours: 8 a.m. to 8:30 p.m.    Patients  confirmed or suspected to have symptoms of COVID 19 or flu:     No visitors allowed for adult patients.   Children (under age 18) can have 1 named visitor.     People who are sick or showing symptoms of COVID 19 or flu:    Are not allowed to visit patients--we can only make exceptions in special situations.       Please follow these guidelines for your visit:          Please maintain social distance          Masking is optional--however at times you may be asked to wear a mask for the safety of yourself and others     Clean your hands with alcohol hand . Do this when you arrive at and leave the building and patient room,    And again after you touch your mask or anything in the room.     Go directly to and from the room you are visiting.     Stay in the patient s room during your visit. Limit going to other places in the hospital as much as possible     Leave bags and jackets at home or in the car.     For everyone s health, please don t come and go during your visit. That includes for smoking   during your visit.

## 2024-11-08 ENCOUNTER — ANESTHESIA (OUTPATIENT)
Dept: SURGERY | Facility: CLINIC | Age: 64
End: 2024-11-08
Payer: COMMERCIAL

## 2024-11-08 ENCOUNTER — HOSPITAL ENCOUNTER (OUTPATIENT)
Facility: CLINIC | Age: 64
Discharge: HOME | End: 2024-11-08
Attending: SURGERY | Admitting: SURGERY
Payer: COMMERCIAL

## 2024-11-08 VITALS
RESPIRATION RATE: 15 BRPM | TEMPERATURE: 97.1 F | SYSTOLIC BLOOD PRESSURE: 119 MMHG | HEIGHT: 67 IN | DIASTOLIC BLOOD PRESSURE: 71 MMHG | WEIGHT: 134.48 LBS | OXYGEN SATURATION: 94 % | BODY MASS INDEX: 21.11 KG/M2 | HEART RATE: 78 BPM

## 2024-11-08 DIAGNOSIS — G89.18 POST-OP PAIN: Primary | ICD-10-CM

## 2024-11-08 PROCEDURE — 370N000017 HC ANESTHESIA TECHNICAL FEE, PER MIN: Performed by: SURGERY

## 2024-11-08 PROCEDURE — 47562 LAPAROSCOPIC CHOLECYSTECTOMY: CPT | Performed by: NURSE ANESTHETIST, CERTIFIED REGISTERED

## 2024-11-08 PROCEDURE — 47562 LAPAROSCOPIC CHOLECYSTECTOMY: CPT | Performed by: STUDENT IN AN ORGANIZED HEALTH CARE EDUCATION/TRAINING PROGRAM

## 2024-11-08 PROCEDURE — 88304 TISSUE EXAM BY PATHOLOGIST: CPT | Mod: 26 | Performed by: PATHOLOGY

## 2024-11-08 PROCEDURE — 250N000011 HC RX IP 250 OP 636: Performed by: NURSE ANESTHETIST, CERTIFIED REGISTERED

## 2024-11-08 PROCEDURE — 710N000012 HC RECOVERY PHASE 2, PER MINUTE: Performed by: SURGERY

## 2024-11-08 PROCEDURE — 999N000141 HC STATISTIC PRE-PROCEDURE NURSING ASSESSMENT: Performed by: SURGERY

## 2024-11-08 PROCEDURE — 250N000009 HC RX 250: Performed by: NURSE ANESTHETIST, CERTIFIED REGISTERED

## 2024-11-08 PROCEDURE — 710N000010 HC RECOVERY PHASE 1, LEVEL 2, PER MIN: Performed by: SURGERY

## 2024-11-08 PROCEDURE — 272N000001 HC OR GENERAL SUPPLY STERILE: Performed by: SURGERY

## 2024-11-08 PROCEDURE — 88304 TISSUE EXAM BY PATHOLOGIST: CPT | Mod: TC | Performed by: SURGERY

## 2024-11-08 PROCEDURE — 360N000076 HC SURGERY LEVEL 3, PER MIN: Performed by: SURGERY

## 2024-11-08 PROCEDURE — 250N000011 HC RX IP 250 OP 636: Performed by: SURGERY

## 2024-11-08 PROCEDURE — 250N000009 HC RX 250: Performed by: SURGERY

## 2024-11-08 PROCEDURE — 250N000025 HC SEVOFLURANE, PER MIN: Performed by: SURGERY

## 2024-11-08 PROCEDURE — 258N000003 HC RX IP 258 OP 636: Performed by: NURSE ANESTHETIST, CERTIFIED REGISTERED

## 2024-11-08 RX ORDER — ONDANSETRON 2 MG/ML
4 INJECTION INTRAMUSCULAR; INTRAVENOUS EVERY 30 MIN PRN
Status: DISCONTINUED | OUTPATIENT
Start: 2024-11-08 | End: 2024-11-08 | Stop reason: HOSPADM

## 2024-11-08 RX ORDER — METOPROLOL TARTRATE 1 MG/ML
1-2 INJECTION, SOLUTION INTRAVENOUS EVERY 5 MIN PRN
Status: DISCONTINUED | OUTPATIENT
Start: 2024-11-08 | End: 2024-11-08 | Stop reason: HOSPADM

## 2024-11-08 RX ORDER — FENTANYL CITRATE 50 UG/ML
INJECTION, SOLUTION INTRAMUSCULAR; INTRAVENOUS PRN
Status: DISCONTINUED | OUTPATIENT
Start: 2024-11-08 | End: 2024-11-08

## 2024-11-08 RX ORDER — NALOXONE HYDROCHLORIDE 0.4 MG/ML
0.1 INJECTION, SOLUTION INTRAMUSCULAR; INTRAVENOUS; SUBCUTANEOUS
Status: DISCONTINUED | OUTPATIENT
Start: 2024-11-08 | End: 2024-11-08 | Stop reason: HOSPADM

## 2024-11-08 RX ORDER — EPHEDRINE SULFATE 50 MG/ML
INJECTION, SOLUTION INTRAMUSCULAR; INTRAVENOUS; SUBCUTANEOUS PRN
Status: DISCONTINUED | OUTPATIENT
Start: 2024-11-08 | End: 2024-11-08

## 2024-11-08 RX ORDER — ONDANSETRON 4 MG/1
4 TABLET, ORALLY DISINTEGRATING ORAL EVERY 30 MIN PRN
Status: DISCONTINUED | OUTPATIENT
Start: 2024-11-08 | End: 2024-11-08 | Stop reason: HOSPADM

## 2024-11-08 RX ORDER — DEXAMETHASONE SODIUM PHOSPHATE 4 MG/ML
INJECTION, SOLUTION INTRA-ARTICULAR; INTRALESIONAL; INTRAMUSCULAR; INTRAVENOUS; SOFT TISSUE PRN
Status: DISCONTINUED | OUTPATIENT
Start: 2024-11-08 | End: 2024-11-08

## 2024-11-08 RX ORDER — KETOROLAC TROMETHAMINE 30 MG/ML
INJECTION, SOLUTION INTRAMUSCULAR; INTRAVENOUS PRN
Status: DISCONTINUED | OUTPATIENT
Start: 2024-11-08 | End: 2024-11-08

## 2024-11-08 RX ORDER — HYDROMORPHONE HCL IN WATER/PF 6 MG/30 ML
0.2 PATIENT CONTROLLED ANALGESIA SYRINGE INTRAVENOUS EVERY 5 MIN PRN
Status: DISCONTINUED | OUTPATIENT
Start: 2024-11-08 | End: 2024-11-08 | Stop reason: HOSPADM

## 2024-11-08 RX ORDER — LIDOCAINE HYDROCHLORIDE 20 MG/ML
INJECTION, SOLUTION INFILTRATION; PERINEURAL PRN
Status: DISCONTINUED | OUTPATIENT
Start: 2024-11-08 | End: 2024-11-08

## 2024-11-08 RX ORDER — FENTANYL CITRATE 50 UG/ML
25 INJECTION, SOLUTION INTRAMUSCULAR; INTRAVENOUS EVERY 5 MIN PRN
Status: DISCONTINUED | OUTPATIENT
Start: 2024-11-08 | End: 2024-11-08 | Stop reason: HOSPADM

## 2024-11-08 RX ORDER — DEXAMETHASONE SODIUM PHOSPHATE 4 MG/ML
4 INJECTION, SOLUTION INTRA-ARTICULAR; INTRALESIONAL; INTRAMUSCULAR; INTRAVENOUS; SOFT TISSUE
Status: DISCONTINUED | OUTPATIENT
Start: 2024-11-08 | End: 2024-11-08 | Stop reason: HOSPADM

## 2024-11-08 RX ORDER — OXYCODONE HYDROCHLORIDE 10 MG/1
10 TABLET ORAL
Status: DISCONTINUED | OUTPATIENT
Start: 2024-11-08 | End: 2024-11-08 | Stop reason: HOSPADM

## 2024-11-08 RX ORDER — OXYCODONE HYDROCHLORIDE 5 MG/1
5 TABLET ORAL EVERY 6 HOURS PRN
Qty: 12 TABLET | Refills: 0 | Status: SHIPPED | OUTPATIENT
Start: 2024-11-08 | End: 2024-11-11

## 2024-11-08 RX ORDER — SODIUM CHLORIDE, SODIUM LACTATE, POTASSIUM CHLORIDE, CALCIUM CHLORIDE 600; 310; 30; 20 MG/100ML; MG/100ML; MG/100ML; MG/100ML
INJECTION, SOLUTION INTRAVENOUS CONTINUOUS PRN
Status: DISCONTINUED | OUTPATIENT
Start: 2024-11-08 | End: 2024-11-08

## 2024-11-08 RX ORDER — HYDROMORPHONE HCL IN WATER/PF 6 MG/30 ML
0.4 PATIENT CONTROLLED ANALGESIA SYRINGE INTRAVENOUS EVERY 5 MIN PRN
Status: DISCONTINUED | OUTPATIENT
Start: 2024-11-08 | End: 2024-11-08 | Stop reason: HOSPADM

## 2024-11-08 RX ORDER — ONDANSETRON 2 MG/ML
INJECTION INTRAMUSCULAR; INTRAVENOUS PRN
Status: DISCONTINUED | OUTPATIENT
Start: 2024-11-08 | End: 2024-11-08

## 2024-11-08 RX ORDER — CEFAZOLIN SODIUM/WATER 2 G/20 ML
2 SYRINGE (ML) INTRAVENOUS SEE ADMIN INSTRUCTIONS
Status: DISCONTINUED | OUTPATIENT
Start: 2024-11-08 | End: 2024-11-08 | Stop reason: HOSPADM

## 2024-11-08 RX ORDER — PROPOFOL 10 MG/ML
INJECTION, EMULSION INTRAVENOUS PRN
Status: DISCONTINUED | OUTPATIENT
Start: 2024-11-08 | End: 2024-11-08

## 2024-11-08 RX ORDER — CEFAZOLIN SODIUM/WATER 2 G/20 ML
2 SYRINGE (ML) INTRAVENOUS
Status: COMPLETED | OUTPATIENT
Start: 2024-11-08 | End: 2024-11-08

## 2024-11-08 RX ORDER — OXYCODONE HYDROCHLORIDE 5 MG/1
5 TABLET ORAL
Status: DISCONTINUED | OUTPATIENT
Start: 2024-11-08 | End: 2024-11-08 | Stop reason: HOSPADM

## 2024-11-08 RX ORDER — INDOCYANINE GREEN AND WATER 25 MG
2.5 KIT INJECTION ONCE
Status: COMPLETED | OUTPATIENT
Start: 2024-11-08 | End: 2024-11-08

## 2024-11-08 RX ORDER — BUPIVACAINE HYDROCHLORIDE AND EPINEPHRINE 2.5; 5 MG/ML; UG/ML
INJECTION, SOLUTION EPIDURAL; INFILTRATION; INTRACAUDAL; PERINEURAL PRN
Status: DISCONTINUED | OUTPATIENT
Start: 2024-11-08 | End: 2024-11-08 | Stop reason: HOSPADM

## 2024-11-08 RX ORDER — FENTANYL CITRATE 50 UG/ML
50 INJECTION, SOLUTION INTRAMUSCULAR; INTRAVENOUS EVERY 5 MIN PRN
Status: DISCONTINUED | OUTPATIENT
Start: 2024-11-08 | End: 2024-11-08 | Stop reason: HOSPADM

## 2024-11-08 RX ADMIN — EPHEDRINE SULFATE 5 MG: 5 INJECTION INTRAVENOUS at 15:36

## 2024-11-08 RX ADMIN — PHENYLEPHRINE HYDROCHLORIDE 200 MCG: 10 INJECTION INTRAVENOUS at 15:18

## 2024-11-08 RX ADMIN — Medication 2.5 MG: at 12:14

## 2024-11-08 RX ADMIN — PHENYLEPHRINE HYDROCHLORIDE 100 MCG: 10 INJECTION INTRAVENOUS at 15:30

## 2024-11-08 RX ADMIN — EPHEDRINE SULFATE 5 MG: 5 INJECTION INTRAVENOUS at 16:37

## 2024-11-08 RX ADMIN — SODIUM CHLORIDE, POTASSIUM CHLORIDE, SODIUM LACTATE AND CALCIUM CHLORIDE: 600; 310; 30; 20 INJECTION, SOLUTION INTRAVENOUS at 14:59

## 2024-11-08 RX ADMIN — EPHEDRINE SULFATE 5 MG: 5 INJECTION INTRAVENOUS at 15:31

## 2024-11-08 RX ADMIN — SODIUM CHLORIDE, POTASSIUM CHLORIDE, SODIUM LACTATE AND CALCIUM CHLORIDE: 600; 310; 30; 20 INJECTION, SOLUTION INTRAVENOUS at 16:26

## 2024-11-08 RX ADMIN — DEXAMETHASONE SODIUM PHOSPHATE 4 MG: 4 INJECTION, SOLUTION INTRA-ARTICULAR; INTRALESIONAL; INTRAMUSCULAR; INTRAVENOUS; SOFT TISSUE at 15:27

## 2024-11-08 RX ADMIN — MIDAZOLAM 2 MG: 1 INJECTION INTRAMUSCULAR; INTRAVENOUS at 14:59

## 2024-11-08 RX ADMIN — PROPOFOL 80 MG: 10 INJECTION, EMULSION INTRAVENOUS at 15:13

## 2024-11-08 RX ADMIN — Medication 50 MG: at 15:14

## 2024-11-08 RX ADMIN — LIDOCAINE HYDROCHLORIDE 60 MG: 20 INJECTION, SOLUTION INFILTRATION; PERINEURAL at 15:13

## 2024-11-08 RX ADMIN — KETOROLAC TROMETHAMINE 15 MG: 30 INJECTION, SOLUTION INTRAMUSCULAR at 16:35

## 2024-11-08 RX ADMIN — FENTANYL CITRATE 50 MCG: 50 INJECTION INTRAMUSCULAR; INTRAVENOUS at 16:26

## 2024-11-08 RX ADMIN — ONDANSETRON 4 MG: 2 INJECTION INTRAMUSCULAR; INTRAVENOUS at 16:30

## 2024-11-08 RX ADMIN — FENTANYL CITRATE 100 MCG: 50 INJECTION INTRAMUSCULAR; INTRAVENOUS at 15:13

## 2024-11-08 RX ADMIN — Medication 100 MG: at 16:43

## 2024-11-08 RX ADMIN — Medication 2 G: at 15:12

## 2024-11-08 RX ADMIN — PROPOFOL 40 MG: 10 INJECTION, EMULSION INTRAVENOUS at 16:26

## 2024-11-08 ASSESSMENT — ACTIVITIES OF DAILY LIVING (ADL)
ADLS_ACUITY_SCORE: 0

## 2024-11-08 NOTE — ANESTHESIA PROCEDURE NOTES
Airway       Patient location during procedure: OR       Procedure Start/Stop Times: 11/8/2024 3:15 PM  Staff -        CRNA: Shilpa Caro APRN CRNA       Performed By: CRNA  Consent for Airway        Urgency: elective  Indications and Patient Condition       Indications for airway management: simin-procedural       Induction type:intravenous       Mask difficulty assessment: 1 - vent by mask    Final Airway Details       Final airway type: endotracheal airway       Successful airway: ETT - single  Endotracheal Airway Details        ETT size (mm): 7.0       Cuffed: yes       Successful intubation technique: direct laryngoscopy       DL Blade Type: MAC 3       Grade View of Cords: 3 (with cricoid.)       Adjucts: stylet       Position: Right       Measured from: lips       Secured at (cm): 23       Bite block used: None    Post intubation assessment        Placement verified by: capnometry, equal breath sounds and chest rise        Number of attempts at approach: 1       Secured with: silk tape       Ease of procedure: easy       Dentition: Unchanged    Medication(s) Administered   Medication Administration Time: 11/8/2024 3:15 PM    Additional Comments       Easy mask.  Short TM distance.  Grade 3 with cricoid.  Would recommend VL.

## 2024-11-08 NOTE — OP NOTE
[facility] Operative Note    Pre-operative diagnosis: Gallstones [K80.20]   Post-operative diagnosis same   Procedure: Procedure(s):  laparoscopic cholecystectomy   Surgeon: Miguelangel June MD   Assistants(s): Maxime TURNER   Estimated blood loss: 10ml    Specimens: gallbladder   Findings: Large stones, evidence of prior inflammation along the neck of the gallbladder         Shira Li was brought to the operating room and placed supine on the operating table with the bed flexed.  They had received indocyanine in the preoperative area.  ABX were given.  They were sedated and intubated without issue, an OG tube was placed.  They had not received a TAP block and so local anesthetic was used along the incisions.  The abdomen was prepped and draped in sterile fashion and a time out was performed.    An infraumbilical incision was made sharply and a rachel port was placed in an open fashion.  The abdomen was insufflated and the laparoscope inserted.  Three 5mm assistant ports were placed under direct vision in the epigastric and right abdominal regions. She was placed with her head and right side up.      The gallbladder was grasped via the assistant port and retracted cephalad.  The gallbladder appeared enlarged and with prior inflammation with a thick rind along the neck..  There were no adhesions along the gallbladder from the omentum.  The infundibulum was grasped and retracted laterally.  Using firefly we were able to identify the cystic duct and common bile duct.  Using hook cautery the peritoneal lining around the infundibulum was dissected off of the gallbladder.  The cystic duct and artery were isolated and the gallbladder was dissected of the cystic plate.  After identifying our critical view of safety with only these two tubular structures entering the gallbladder we clipped the cystic duct and cystic artery with hemolock clips.  The distal cystic artery was taken with cautery to divide it.   The cystic duct was transected sharply between the 2nd and 3rd clips.  The gallbladder was then taken off of the liver with cautery and removed from the abdomen.  The clips were inspected and found to be intact without spillage of blood or bile.  Spillage of bile had not occurred.     The ports were then removed and the abdomen allowed to collapse.  The umbilical fascia was closed with interrupted 0 vicryl suture.  Skin was closed with 4.0 monocryl and steri strips applied. Shira ALEJANDRA Li  was then woken from anesthesia, extubated and brought to recovery.    I performed the procedure.

## 2024-11-08 NOTE — DISCHARGE INSTRUCTIONS
Gallbladder Removal Surgery: What to Expect at Home  Your Recovery  After your surgery, you will likely feel weak and tired for several days after you return home. Your belly may be swollen. If you had laparoscopic surgery, it's normal to also have some shoulder pain. This is caused by the air that your doctor put in your belly to help see your organs better.  You may have gas or need to burp a lot at first. A few people get diarrhea. The diarrhea usually goes away in 2 to 4 weeks, but it may last longer.  How quickly you recover depends on whether you had a laparoscopic or open surgery.  For a laparoscopic surgery, most people can go back to work or their normal routine in 1 to 2 weeks. But it may take longer, depending on the type of work you do.  For an open surgery, it will probably take 4 to 6 weeks before you get back to your normal routine.  This care sheet gives you a general idea about how long it will take for you to recover. However, each person recovers at a different pace. Follow the steps below to get better as quickly as possible.  How can you care for yourself at home?  Activity    Rest when you feel tired. Getting enough sleep will help you recover.     Try to walk each day. Start out by walking a little more than you did the day before. Walking helps prevent blood clots in your legs and pneumonia.     For about 2 to 4 weeks, avoid lifting anything that would make you strain. This may include a child, heavy grocery bags and milk containers, a heavy briefcase or backpack, cat litter or dog food bags, or a vacuum .     Avoid strenuous activities, such as biking, jogging, weightlifting, and aerobic exercise, until your doctor says it is okay.     Ask your doctor when you can drive again.     For a laparoscopic surgery, most people can go back to work or their normal routine in 1 to 2 weeks, but it may take longer. For an open surgery, it will probably take 4 to 6 weeks before you get back to  your normal routine.     Your doctor will tell you when you can have sex again.   Diet    When you feel like eating, start with small amounts of food. You may want to avoid fatty foods like fried foods or cheese for a while. They can cause symptoms, such as diarrhea or bloating.     Drink plenty of fluids (unless your doctor tells you not to).     You may notice that your bowel movements are not regular right after your surgery. This is common. Try to avoid constipation and straining with bowel movements. You may want to take a fiber supplement every day. If you have not had a bowel movement after a couple of days, ask your doctor about taking a mild laxative.   Medicines    You can restart your medicines. You will also be given instructions about taking any new medicines.     If you stopped taking aspirin or some other blood thinner, you can start taking it again.     Be safe with medicines. Read and follow all instructions on the label.  If the doctor gave you a prescription medicine for pain, take it as prescribed.  If you are not taking a prescription pain medicine, ask your doctor if you can take an over-the-counter medicine.  Do not take two or more pain medicines at the same time unless the doctor told you to. Many pain medicines contain acetaminophen, which is Tylenol. Too much Tylenol can be harmful.     If you think your pain medicine is making you sick to your stomach:  Take your medicine after meals (unless your doctor tells you not to).  Ask your doctor for a different pain medicine.     If your doctor prescribed antibiotics, take them as directed. Do not stop taking them just because you feel better. You need to take the full course of antibiotics.   Incision care    If you have strips of tape on the cut (incision) the doctor made, leave the tape on for a week or until it falls off.     You may shower 24 hours after surgery. Pat the incision dry. Do not take a bath for the first 2 weeks.          Keep  "the area clean and dry. You may cover it with a gauze bandage if it oozes fluid or rubs against clothing. Change the bandage every day.   Ice    To reduce swelling and pain, put ice or a cold pack on your belly for 10 to 20 minutes at a time. Do this every 1 to 2 hours. Put a thin cloth between the ice and your skin.   Follow-up care is a key part of your treatment and safety. Be sure to make and go to all appointments, and call your doctor if you are having problems. It's also a good idea to know your test results and keep a list of the medicines you take.  When should you call for help?   Call 911 anytime you think you may need emergency care. For example, call if:    You passed out (lost consciousness).     You are short of breath.   Call your doctor now or seek immediate medical care if:    You are sick to your stomach and cannot drink fluids.     You have pain that does not get better when you take your pain medicine.     You cannot pass stools or gas.     You have signs of infection, such as:  Increased pain, swelling, warmth, or redness.  Red streaks leading from the incision.  Pus draining from the incision.  A fever.     Bright red blood has soaked through the bandage over your incision.     You have loose stitches, or your incision comes open.     You have signs of a blood clot in your leg (called a deep vein thrombosis), such as:  Pain in your calf, back of knee, thigh, or groin.  Redness and swelling in your leg or groin.   Watch closely for any changes in your health, and be sure to contact your doctor if you have any problems.  Where can you learn more?  Go to https://www.[employer].net/patiented  Enter F357 in the search box to learn more about \"Gallbladder Removal Surgery: What to Expect at Home.\"  Current as of: July 26, 2023  Content Version: 14.2 2024 [employer].   Care instructions adapted under license by your healthcare professional. If you have questions about a medical " condition or this instruction, always ask your healthcare professional. [employer], [employer] disclaims any warranty or liability for your use of this information.    Contacting your Doctor -   To contact Miguelangel June MD , call or:  [phone] and ask for the resident on call for Surgery (answered 24 hours a day)   Emergency Department:  [facility]: [phone] 911 if you are in need of immediate or emergent help

## 2024-11-08 NOTE — ANESTHESIA PREPROCEDURE EVALUATION
Anesthesia Pre-Procedure Evaluation    [facility]   MRN: [id number] : 1960        Procedure : Procedure(s):  laparoscopic cholecystectomy          Past Medical History:   Diagnosis Date    Broken arm     as a child     CARDIOVASCULAR SCREENING; LDL GOAL LESS THAN 130     MUNOZ (dyspnea on exertion)     Fibula fracture     as a child     Hollenhorst plaque, left eye       Past Surgical History:   Procedure Laterality Date    COLONOSCOPY      HEART CATH LEFT HEART CATH  2015    Angiographic normal coronary arteries     ORTHOPEDIC SURGERY  1967    leg fracture       Allergies   Allergen Reactions    Acetaminophen Other (See Comments)     PST insufficency    Breathing problems    Sulfa Antibiotics Other (See Comments)     Headaches.    Breathing problems    Casein      PST Enzyme Deficiency    Gluten Meal      PST Enzyme Deficiency    Methylpyrrolidone     Yellow Dyes (Non-Tartrazine)      Horrible Headache    Albuterol Cough and Difficulty breathing    Salicylic Acid-Sulfur Other (See Comments)     Breathing problems      Social History     Tobacco Use    Smoking status: Never     Passive exposure: Past    Smokeless tobacco: Never   Substance Use Topics    Alcohol use: Not Currently     Comment: 1 drink per month      Wt Readings from Last 1 Encounters:   24 61 kg (134 lb 7.7 oz)        Anesthesia Evaluation   Pt has had prior anesthetic. Type: General and MAC (as a child).        ROS/MED HX  ENT/Pulmonary:  - neg pulmonary ROS     Neurologic:  - neg neurologic ROS     Cardiovascular:  - neg cardiovascular ROS     METS/Exercise Tolerance:     Hematologic:  - neg hematologic  ROS     Musculoskeletal:  - neg musculoskeletal ROS     GI/Hepatic: Comment: Rare enzyme deficiency associated with Autism.       Renal/Genitourinary:  - neg Renal ROS     Endo:  - neg endo ROS     Psychiatric/Substance Use:       Infectious Disease:  - neg infectious disease ROS     Malignancy:  -  "neg malignancy ROS     Other:            Physical Exam    Airway        Mallampati: II   TM distance: > 3 FB   Neck ROM: full   Mouth opening: > 3 cm    Respiratory Devices and Support         Dental       (+) Minor Abnormalities - some fillings, tiny chips      Cardiovascular   cardiovascular exam normal       Rhythm and rate: regular and normal     Pulmonary   pulmonary exam normal                OUTSIDE LABS:  CBC:   Lab Results   Component Value Date    WBC 5.6 08/26/2024    WBC 5.6 12/27/2023    HGB 15.3 08/26/2024    HGB 14.8 12/27/2023    HCT 45.3 08/26/2024    HCT 43.6 12/27/2023     08/26/2024     12/27/2023     BMP:   Lab Results   Component Value Date     08/26/2024     12/27/2023    POTASSIUM 4.6 08/26/2024    POTASSIUM 4.5 12/27/2023    CHLORIDE 104 08/26/2024    CHLORIDE 104 12/27/2023    CO2 23 08/26/2024    CO2 25 12/27/2023    BUN 15.7 08/26/2024    BUN 9.7 12/27/2023    CR 0.89 08/26/2024    CR 0.84 12/27/2023    GLC 91 08/26/2024    GLC 94 12/27/2023     COAGS:   Lab Results   Component Value Date    PTT 28 03/03/2015    INR 0.95 03/03/2015     POC: No results found for: \"BGM\", \"HCG\", \"HCGS\"  HEPATIC:   Lab Results   Component Value Date    ALBUMIN 4.5 08/26/2024    PROTTOTAL 7.4 08/26/2024    ALT 12 08/26/2024    AST 17 08/26/2024    ALKPHOS 101 08/26/2024    BILITOTAL 0.5 08/26/2024     OTHER:   Lab Results   Component Value Date    A1C 5.3 11/08/2022    REHANA 9.5 08/26/2024    LIPASE 25 08/26/2024    AMYLASE 59 08/26/2024    TSH 1.74 11/08/2022    T4 1.12 02/06/2015    SED 6 08/26/2024       Anesthesia Plan    ASA Status:  2    NPO Status:  NPO Appropriate    Anesthesia Type: General.     - Airway: ETT   Induction: Intravenous, Propofol.   Maintenance: Balanced.   Techniques and Equipment:     - Airway: Video-Laryngoscope     - Lines/Monitors: 2nd IV, BIS     Consents    Anesthesia Plan(s) and associated risks, benefits, and realistic alternatives discussed. Questions " answered and patient/representative(s) expressed understanding.     - Discussed: Risks, Benefits and Alternatives for the PROCEDURE were discussed     - Discussed with:  Patient (daughter present)      - Extended Intubation/Ventilatory Support Discussed: Yes.      - Patient is DNR/DNI Status: No     Use of blood products discussed: Yes.     - Discussed with: Patient.     - Consented: consented to blood products     Postoperative Care    Pain management: IV analgesics, Oral pain medications, Multi-modal analgesia.   PONV prophylaxis: Dexamethasone or Solumedrol, Background Propofol Infusion     Comments:    Other Comments: Discussion with patient regarding rare enzyme deficiency. Plan for less rather than more medications. Signing out case to Dr. Cantu. Introduced him to patient at bedside and discussed talks.            Roberto Calvert MD    I have reviewed the pertinent notes and labs in the chart from the past 30 days and (re)examined the patient.  Any updates or changes from those notes are reflected in this note.

## 2024-11-08 NOTE — ANESTHESIA CARE TRANSFER NOTE
Patient: Shira Tejaad [facility]    Procedure: Procedure(s):  laparoscopic cholecystectomy       Diagnosis: Gallstones [K80.20]  Diagnosis Additional Information: No value filed.    Anesthesia Type:   General     Note:    Oropharynx: oropharynx clear of all foreign objects and spontaneously breathing  Level of Consciousness: drowsy  Oxygen Supplementation: face mask  Level of Supplemental Oxygen (L/min / FiO2): 10  Independent Airway: airway patency satisfactory and stable  Dentition: dentition unchanged  Vital Signs Stable: post-procedure vital signs reviewed and stable  Report to RN Given: handoff report given  Patient transferred to: PACU    Handoff Report: Identifed the Patient, Identified the Reponsible Provider, Reviewed the pertinent medical history, Discussed the surgical course, Reviewed Intra-OP anesthesia mangement and issues during anesthesia, Set expectations for post-procedure period and Allowed opportunity for questions and acknowledgement of understanding      Vitals:  Vitals Value Taken Time   /63 11/08/24 1700   Temp 98.3    Pulse 62 11/08/24 1704   Resp 18 11/08/24 1704   SpO2 100 % 11/08/24 1704   Vitals shown include unfiled device data.    Electronically Signed By: Shilpa Caro CRNA, APRN CRNA  November 8, 2024  5:05 PM

## 2024-11-09 NOTE — ANESTHESIA POSTPROCEDURE EVALUATION
[facility]    Procedure: Procedure(s):  laparoscopic cholecystectomy       Anesthesia Type:  General    Note:  Disposition: Outpatient   Postop Pain Control: Uneventful            Sign Out: Well controlled pain   PONV: No   Neuro/Psych: Uneventful            Sign Out: Acceptable/Baseline neuro status   Airway/Respiratory: Uneventful            Sign Out: Acceptable/Baseline resp. status   CV/Hemodynamics: Uneventful            Sign Out: Acceptable CV status; No obvious hypovolemia; No obvious fluid overload   Other NRE: NONE   DID A NON-ROUTINE EVENT OCCUR? No           Last vitals:  Vitals Value Taken Time   /73 11/08/24 1820   Temp 36.4  C (97.5  F) 11/08/24 1700   Pulse 95 11/08/24 1825   Resp 15 11/08/24 1825   SpO2 96 % 11/08/24 1825   Vitals shown include unfiled device data.    Electronically Signed By: Hayden Hui MD  November 8, 2024  6:26 PM

## 2024-11-11 ENCOUNTER — PATIENT OUTREACH (OUTPATIENT)
Dept: SURGERY | Facility: CLINIC | Age: 64
End: 2024-11-11
Payer: COMMERCIAL

## 2024-11-11 NOTE — PROGRESS NOTES
RN Post-Op/Post-Discharge Care Coordination Note    Ms. Shira Li is a 64 year old female who underwent laparoscopic cholecystectomy on 11/8 with  Dr. Miguelangel June.  Spoke with Patient's daughter.     Support  Patient able to care for self independently.     Health Status  Nausea/Vomiting: Patient denies nausea/vomiting.  Eating/drinking: Patient is able to eat and drink without any complaints. Has been scared to eat over the weekend. Her daugther is pushing food and fluids today.   Diet:  Regular  Bowel habits: Patient reports no bowel movement since surgery. Is passing gas. Discussed use of OTC Senna or Miralax per package instructions if needed.  Fevers/chills: Patient denies any fever or chills. Has a low temp this morning in the 95F range. May be related to lack of nutrition or inaccurate thermometer. No other symptoms of concern at this time.   Incisions: Patient denies any signs and symptoms of infection.  Wound closure: Steri-strips  Pain: rates pain 1/10. Is not taking anything for pain control at this time.   New Medications:  oxycodone    Activity/Restrictions  No lifting in excess of 15-20 pounds for 3-4 weeks    Equipment  None    Pathology reviewed with patient:  No, not yet available.    Forms/Letters  No    All of her questions were answered including reviewing restrictions and wound care.  She will call this office if she has any further questions and/or concerns.      In lieu of a post-op clinic patient that patient would like to be contacted in approximately 7-10 days via telephone.    A copy of this note was routed to the primary surgeon.    Whom and When to Call  Patient acknowledges understanding of how to manage any medication changes and when to seek medical care.     Patient advised that if after hour medical concerns arise to please call [phone] and choose option 4 to speak to the physician on call.

## 2024-11-13 LAB
PATH REPORT.COMMENTS IMP SPEC: NORMAL
PATH REPORT.COMMENTS IMP SPEC: NORMAL
PATH REPORT.FINAL DX SPEC: NORMAL
PATH REPORT.GROSS SPEC: NORMAL
PATH REPORT.MICROSCOPIC SPEC OTHER STN: NORMAL
PATH REPORT.RELEVANT HX SPEC: NORMAL
PHOTO IMAGE: NORMAL

## 2024-11-18 ENCOUNTER — PATIENT OUTREACH (OUTPATIENT)
Dept: SURGERY | Facility: CLINIC | Age: 64
End: 2024-11-18
Payer: COMMERCIAL

## 2024-11-18 NOTE — PROGRESS NOTES
"Shira Tejada [facility] was contacted several days post procedure via telephone for a status update and elected to have a telephone follow -up call approximately 7-10 days after original contact in lieu of a clinic visit with Dr. Miguelangel June.  She continues to do well and the steri-strips  have peeled off.  The patients wounds are healed and the area is C/D/I but the largest incision is \"protruding\" slightly. No redness, warmth, or drainage. May be a seroma. Advised to try heat applications to this 3-4x/day for 20min intervals protecting skin from injury.  She is afebrile, pain is improving, and amanda po; the patient is slowly resuming her normal activity. Discussed restrictions including no lifting in excess of 15 pounds for 2 more weeks.    Pathology was reviewed with the patient: Yes  Final Diagnosis   A. GALLBLADDER, CHOLECYSTECTOMY:  - Chronic cholecystitis with cholelithiasis        All of Shira Li question's were answered and  she would like to return to the clinic on a PRN basis. The patient is aware that  she may schedule a post op appointment at any time.    A copy of this note was routed to the patients surgeon.          "

## 2024-11-19 ENCOUNTER — DOCUMENTATION ONLY (OUTPATIENT)
Dept: OTHER | Facility: CLINIC | Age: 64
End: 2024-11-19

## 2024-12-16 ENCOUNTER — OFFICE VISIT (OUTPATIENT)
Dept: FAMILY MEDICINE | Facility: CLINIC | Age: 64
End: 2024-12-16
Payer: MEDICARE

## 2024-12-16 VITALS
OXYGEN SATURATION: 97 % | HEART RATE: 86 BPM | BODY MASS INDEX: 20.89 KG/M2 | DIASTOLIC BLOOD PRESSURE: 83 MMHG | HEIGHT: 67 IN | RESPIRATION RATE: 18 BRPM | TEMPERATURE: 97.1 F | WEIGHT: 133.1 LBS | SYSTOLIC BLOOD PRESSURE: 128 MMHG

## 2024-12-16 DIAGNOSIS — E78.00 HYPERCHOLESTEREMIA: ICD-10-CM

## 2024-12-16 DIAGNOSIS — Z78.0 ASYMPTOMATIC MENOPAUSE: ICD-10-CM

## 2024-12-16 DIAGNOSIS — Z00.00 ENCOUNTER FOR MEDICARE ANNUAL WELLNESS EXAM: Primary | ICD-10-CM

## 2024-12-16 LAB
CHOLEST SERPL-MCNC: 199 MG/DL
FASTING STATUS PATIENT QL REPORTED: ABNORMAL
HDLC SERPL-MCNC: 60 MG/DL
LDLC SERPL CALC-MCNC: 117 MG/DL
NONHDLC SERPL-MCNC: 139 MG/DL
TRIGL SERPL-MCNC: 109 MG/DL

## 2024-12-16 PROCEDURE — 90480 ADMN SARSCOV2 VAC 1/ONLY CMP: CPT | Performed by: FAMILY MEDICINE

## 2024-12-16 PROCEDURE — G0402 INITIAL PREVENTIVE EXAM: HCPCS | Performed by: FAMILY MEDICINE

## 2024-12-16 PROCEDURE — G0008 ADMIN INFLUENZA VIRUS VAC: HCPCS | Performed by: FAMILY MEDICINE

## 2024-12-16 PROCEDURE — 90662 IIV NO PRSV INCREASED AG IM: CPT | Performed by: FAMILY MEDICINE

## 2024-12-16 PROCEDURE — 36415 COLL VENOUS BLD VENIPUNCTURE: CPT | Performed by: FAMILY MEDICINE

## 2024-12-16 PROCEDURE — 80061 LIPID PANEL: CPT | Performed by: FAMILY MEDICINE

## 2024-12-16 PROCEDURE — 91320 SARSCV2 VAC 30MCG TRS-SUC IM: CPT | Performed by: FAMILY MEDICINE

## 2024-12-16 RX ORDER — METAPROTERENOL SULFATE 10 MG
1000 TABLET ORAL DAILY
COMMUNITY
Start: 2024-12-16

## 2024-12-16 RX ORDER — UBIDECARENONE 50 MG
50 CAPSULE ORAL DAILY
COMMUNITY
Start: 2024-12-16

## 2024-12-16 SDOH — HEALTH STABILITY: PHYSICAL HEALTH
ON AVERAGE, HOW MANY DAYS PER WEEK DO YOU ENGAGE IN MODERATE TO STRENUOUS EXERCISE (LIKE A BRISK WALK)?: PATIENT DECLINED

## 2024-12-16 ASSESSMENT — SOCIAL DETERMINANTS OF HEALTH (SDOH): HOW OFTEN DO YOU GET TOGETHER WITH FRIENDS OR RELATIVES?: THREE TIMES A WEEK

## 2024-12-16 NOTE — PATIENT INSTRUCTIONS
Patient Education   Preventive Care Advice   This is general advice given by our system to help you stay healthy. However, your care team may have specific advice just for you. Please talk to your care team about your preventive care needs.  Nutrition  Eat 5 or more servings of fruits and vegetables each day.  Try wheat bread, brown rice and whole grain pasta (instead of white bread, rice, and pasta).  Get enough calcium and vitamin D. Check the label on foods and aim for 100% of the RDA (recommended daily allowance).  Lifestyle  Exercise at least 150 minutes each week  (30 minutes a day, 5 days a week).  Do muscle strengthening activities 2 days a week. These help control your weight and prevent disease.  No smoking.  Wear sunscreen to prevent skin cancer.  Have a dental exam and cleaning every 6 months.  Yearly exams  See your health care team every year to talk about:  Any changes in your health.  Any medicines your care team has prescribed.  Preventive care, family planning, and ways to prevent chronic diseases.  Shots (vaccines)   HPV shots (up to age 26), if you've never had them before.  Hepatitis B shots (up to age 59), if you've never had them before.  COVID-19 shot: Get this shot when it's due.  Flu shot: Get a flu shot every year.  Tetanus shot: Get a tetanus shot every 10 years.  Pneumococcal, hepatitis A, and RSV shots: Ask your care team if you need these based on your risk.  Shingles shot (for age 50 and up)  General health tests  Diabetes screening:  Starting at age 35, Get screened for diabetes at least every 3 years.  If you are younger than age 35, ask your care team if you should be screened for diabetes.  Cholesterol test: At age 39, start having a cholesterol test every 5 years, or more often if advised.  Bone density scan (DEXA): At age 50, ask your care team if you should have this scan for osteoporosis (brittle bones).  Hepatitis C: Get tested at least once in your life.  STIs (sexually  transmitted infections)  Before age 24: Ask your care team if you should be screened for STIs.  After age 24: Get screened for STIs if you're at risk. You are at risk for STIs (including HIV) if:  You are sexually active with more than one person.  You don't use condoms every time.  You or a partner was diagnosed with a sexually transmitted infection.  If you are at risk for HIV, ask about PrEP medicine to prevent HIV.  Get tested for HIV at least once in your life, whether you are at risk for HIV or not.  Cancer screening tests  Cervical cancer screening: If you have a cervix, begin getting regular cervical cancer screening tests starting at age 21.  Breast cancer scan (mammogram): If you've ever had breasts, begin having regular mammograms starting at age 40. This is a scan to check for breast cancer.  Colon cancer screening: It is important to start screening for colon cancer at age 45.  Have a colonoscopy test every 10 years (or more often if you're at risk) Or, ask your provider about stool tests like a FIT test every year or Cologuard test every 3 years.  To learn more about your testing options, visit:   .  For help making a decision, visit:   https://bit.ly/cd12588.  Prostate cancer screening test: If you have a prostate, ask your care team if a prostate cancer screening test (PSA) at age 55 is right for you.  Lung cancer screening: If you are a current or former smoker ages 50 to 80, ask your care team if ongoing lung cancer screenings are right for you.  For informational purposes only. Not to replace the advice of your health care provider. Copyright   2023 [employer]. All rights reserved. Clinically reviewed by the [facility] Transitions Program. [employer] [id number] - REV 01/24.

## 2024-12-17 ENCOUNTER — PATIENT OUTREACH (OUTPATIENT)
Dept: CARE COORDINATION | Facility: CLINIC | Age: 64
End: 2024-12-17
Payer: MEDICARE

## 2024-12-17 NOTE — PROGRESS NOTES
Preventive Care Visit  [facility]  Carlito Samuel MD, Family Medicine  Dec 16, 2024        Assessment & Plan     Encounter for Medicare annual wellness exam    - Omega-3 Fish Oil 500 MG capsule  - coenzyme Q-10 (CO-Q10) 50 MG capsule    Asymptomatic menopause  - DX Bone Density    Hypercholesteremia  - Lipid panel reflex to direct LDL Non-fasting  - Lipid panel reflex to direct LDL Non-fasting      Counseling  Appropriate preventive services were addressed with this patient via screening, questionnaire, or discussion as appropriate for fall prevention, nutrition, physical activity, social engagement, weight loss and cognition.  Checklist reviewing preventive services available has been given to the patient.  Reviewed patient's diet, addressing concerns and/or questions.     No follow-ups on file.    Follow-up Visit   Expected date:  Dec 23, 2025 (Approximate)      Follow Up Appointment Details:     Follow-up with whom?: PCP    Follow-Up for what?: Medicare Wellness    Welcome or Annual?: Annual Wellness    How?: In Person                     Hector Samuel MD     [address] [address]  [employer]     Office: [phone]         Amparo Silva is a 64 year old, presenting for the following:  Physical        12/16/2024     1:31 PM   Additional Questions   Roomed by Ky DUNCAN   Accompanied by self          HPI  History of Present Illness    Shira Tejada [facility], 64 years old    Family History of Cancer  She recently discovered that her great aunt was actually her grandmother, who had colon cancer. Her grandmother had surgery in 1990, and the cancer returned in 1995. This revelation has clarified many family questions for her. She notes that her grandmother had a diet high in sweets and red meat.    Gallbladder Surgery  She had her gallbladder removed recently, and the recovery took longer than expected. Initially told it  would take a week, she found it took about four weeks to feel normal again. She experienced discomfort and difficulty standing up straight during recovery, but currently has no issues.    Weight Loss  She has experienced significant weight loss, which she attributes to stress related to her 's recent cancer diagnosis. She is mindful of maintaining her health during this stressful period.    's Health  Her  was diagnosed with stage 4 cancer after initially presenting with digestive issues and a peptic ulcer. A biopsy revealed cancer cells, and a tumor was identified a month later. His treatment was delayed due to a kidney stone, but he is now undergoing chemotherapy, with the tumor shrinking by over 50%.    Hearing Sensitivity  She has a long-standing sensitivity to loud noises, which she has managed by avoiding noisy environments and using earplugs when necessary. She has adapted to this sensitivity over time.    Bone Health Concerns  She has a history of fractures that took an unusually long time to heal, including a broken arm and fibula during childhood. She is considering a bone density test due to these past issues and her family history of hip fractures.    Allergy to Tylenol  She has an allergy to Tylenol, which is shared by her  and son. She experiences hallucinations when taking certain pain medications, including oxycodone, which she discontinued after three days due to adverse effects.    Health Care Directive  Patient has a Health Care Directive on file  Advance care planning document is on file and is current.      12/16/2024   General Health   How would you rate your overall physical health? Good   Feel stress (tense, anxious, or unable to sleep) Only a little      (!) STRESS CONCERN      12/16/2024   Nutrition   Diet: Low fat/cholesterol    Breakfast skipped    Gluten-free/reduced       Multiple values from one day are sorted in reverse-chronological order          12/16/2024   Exercise   Days per week of moderate/strenous exercise Patient declined            12/16/2024   Social Factors   Frequency of gathering with friends or relatives Three times a week   Worry food won't last until get money to buy more No   Food not last or not have enough money for food? No   Do you have housing? (Housing is defined as stable permanent housing and does not include staying ouside in a car, in a tent, in an abandoned building, in an overnight shelter, or couch-surfing.) Yes   Are you worried about losing your housing? No   Lack of transportation? No   Unable to get utilities (heat,electricity)? No            12/16/2024   Fall Risk   Fallen 2 or more times in the past year? No    Trouble with walking or balance? No        Patient-reported          12/16/2024   Activities of Daily Living- Home Safety   Needs help with the following daily activites None of the above   Safety concerns in the home None of the above            12/16/2024   Dental   Dentist two times every year? Yes            12/16/2024   Hearing Screening   Hearing concerns? None of the above            12/16/2024   Driving Risk Screening   Patient/family members have concerns about driving No            12/16/2024   General Alertness/Fatigue Screening   Have you been more tired than usual lately? No            12/16/2024   Urinary Incontinence Screening   Bothered by leaking urine in past 6 months No                 Today's PHQ-2 Score:       5/28/2024     3:30 PM   PHQ-2 ( 1999 Pfizer)   Q1: Little interest or pleasure in doing things 0   Q2: Feeling down, depressed or hopeless 0   PHQ-2 Score 0         12/16/2024   Substance Use   Alcohol more than 3/day or more than 7/wk Not Applicable   Do you have a current opioid prescription? No   How severe/bad is pain from 1 to 10? 0/10 (No Pain)   Do you use any other substances recreationally? No        Social History     Tobacco Use    Smoking status: Never     Passive exposure:  Past    Smokeless tobacco: Never   Substance Use Topics    Alcohol use: Not Currently     Comment: 1 drink per month    Drug use: No           12/15/2023   LAST FHS-7 RESULTS   1st degree relative breast or ovarian cancer No    Any relative bilateral breast cancer No    Any male have breast cancer No    Any ONE woman have BOTH breast AND ovarian cancer No    Any woman with breast cancer before 50yrs No    2 or more relatives with breast AND/OR ovarian cancer No    2 or more relatives with breast AND/OR bowel cancer Yes        Patient-reported        Mammogram Screening - Mammogram every 1-2 years updated in Health Maintenance based on mutual decision making      History of abnormal Pap smear: No - age 65 or older with adequate negative prior screening test results (3 consecutive negative cytology results, 2 consecutive negative cotesting results, or 2 consecutive negative HrHPV test results within 10 years, with the most recent test occurring within the recommended screening interval for the test used)        Latest Ref Rng & Units 11/8/2022     2:49 PM 2/6/2015    12:00 AM 6/18/2012     9:32 AM   PAP / HPV   PAP  Negative for Intraepithelial Lesion or Malignancy (NILM)      PAP (Historical)   NIL  NIL    HPV 16 DNA Negative Negative      HPV 18 DNA Negative Negative      Other HR HPV Negative Negative        ASCVD Risk   The 10-year ASCVD risk score (Teresita DK, et al., 2019) is: 5.4%    Values used to calculate the score:      Age: 64 years      Sex: Female      Is Non- : No      Diabetic: No      Tobacco smoker: No      Systolic Blood Pressure: 128 mmHg      Is BP treated: No      HDL Cholesterol: 60 mg/dL      Total Cholesterol: 199 mg/dL            Reviewed and updated as needed this visit by Provider   Tobacco  Allergies  Meds  Problems  Med Hx  Surg Hx  Fam Hx            Current providers sharing in care for this patient include:  Patient Care Team:  Carlito Samuel MD  "as PCP - General (Family Medicine)  Carlito Samuel MD as Assigned PCP  Scar Pozo MD as Assigned Musculoskeletal Provider  Miguelangel June MD as MD (Critical Care)  Emily Saxena MD as Assigned Neuroscience Provider  Miguelangel June MD as Assigned Surgical Provider  Deb Stanton, RN as Registered Nurse    The following health maintenance items are reviewed in Epic and correct as of today:  Health Maintenance   Topic Date Due    DEXA  Never done    Pneumococcal Vaccine: 65+ Years (1 of 2 - PCV) Never done    ZOSTER IMMUNIZATION (1 of 2) Never done    DTAP/TDAP/TD IMMUNIZATION (1 - Tdap) Never done    RSV VACCINE (1 - Risk 60-74 years 1-dose series) Never done    MAMMO SCREENING  12/16/2023    COVID-19 Vaccine (6 - 2024-25 season) 02/10/2025    CMP  08/26/2025    CBC  08/26/2025    COLORECTAL CANCER SCREENING  12/05/2025    MEDICARE ANNUAL WELLNESS VISIT  12/16/2025    LIPID  12/16/2025    FALL RISK ASSESSMENT  12/16/2025    GLUCOSE  08/26/2027    ADVANCE CARE PLANNING  11/19/2029    HEPATITIS C SCREENING  Completed    HIV SCREENING  Completed    PHQ-2 (once per calendar year)  Completed    INFLUENZA VACCINE  Completed    HPV IMMUNIZATION  Aged Out    MENINGITIS IMMUNIZATION  Aged Out    RSV MONOCLONAL ANTIBODY  Aged Out    PAP  Discontinued        Objective    Exam  /83   Pulse 86   Temp 97.1  F (36.2  C) (Tympanic)   Resp 18   Ht 1.689 m (5' 6.5\")   Wt 60.4 kg (133 lb 1.6 oz)   LMP 05/18/2012   SpO2 97%   BMI 21.16 kg/m     Estimated body mass index is 21.16 kg/m  as calculated from the following:    Height as of this encounter: 1.689 m (5' 6.5\").    Weight as of this encounter: 60.4 kg (133 lb 1.6 oz).    Physical Exam  GENERAL APPEARANCE: healthy, alert and no distress  EYES: Eyes grossly normal to inspection, PERRL and conjunctivae and sclerae normal  HENT: ear canals and TM's normal, nose and mouth without ulcers or lesions, oropharynx clear and oral mucous " membranes moist  NECK: no adenopathy, no asymmetry, masses, or scars and thyroid normal to palpation  RESP: lungs clear to auscultation - no rales, rhonchi or wheezes  CV: regular rate and rhythm, normal S1 S2, no S3 or S4, no murmur, click or rub, no peripheral edema and peripheral pulses strong  ABDOMEN: soft, nontender, no hepatosplenomegaly, no masses and bowel sounds normal  MS: no musculoskeletal defects are noted and gait is age appropriate without ataxia  SKIN: no suspicious lesions or rashes  NEURO: Normal strength and tone, sensory exam grossly normal, mentation intact and speech normal  PSYCH: mentation appears normal and affect normal/bright  LYMPHATICS: ant. cervical- normal, post. cervical- normal, axillary- normal, supraclavicular- normal, inguinal- normal  BREAST: declined exam   (female): declined exam           12/17/2024   Mini Cog   Clock Draw Score 2 Normal   3 Item Recall 3 objects recalled   Mini Cog Total Score 5            Vision Screen  Reason Vision Screen Not Completed: Screening Recommend: Patient/Guardian Declined (just had vision checked end of november .[facility] for the examine)      Signed Electronically by: Carlito Samuel MD

## 2024-12-17 NOTE — RESULT ENCOUNTER NOTE
Dear Shira,    Here is a summary of your recent test results:  -Lipid panel: The ASCVD risk score returns the percentage likelihood of a first time Atherosclerotic Cardiovascular Disease (ASCVD) event.    The 10-year ASCVD risk score (Teresita MCKEE, et al., 2019) is: 5.4%    Values used to calculate the score:      Age: 64 years      Sex: Female      Is Non- : No      Diabetic: No      Tobacco smoker: No      Systolic Blood Pressure: 128 mmHg      Is BP treated: No      HDL Cholesterol: 60 mg/dL      Total Cholesterol: 199 mg/dL    -5.4% of patients that have a similar cholesterol profile to you will have a stroke, heart attack or death (related to heart disease) within the next 10 years and that is considered a low risk and cholesterol lowering medications are not  recommended at this time (high risk is >10%, or >7.5% if other risk factors such has high blood pressure or other heart disease risk factors).    -LDL(bad) cholesterol level is elevated which can increase your heart disease risk.  A diet high in fat and simple carbohydrates, genetics and being overweight can contribute to this. ADVISE: exercising 150 minutes of aerobic exercise per week (30 minutes for 5 days per week or 50 minutes for 3 days per week are options) and eating a low saturated fat/low carbohydrate diet are helpful to improve this.    For additional lab test information, www.testing.com is a very good reference.    In addition, here is a list of due or overdue Health Maintenance reminders:  Osteoporosis Screening Never done  Pneumococcal Vaccine(1 of 2 - PCV) Never done  Zoster (Shingles) Vaccine(1 of 2) Never done  Diptheria Tetanus Pertussis (DTAP/TDAP/TD) Vaccine(1 - Tdap) Never done  RSV VACCINE(1 - Risk 60-74 years 1-dose series) Never done  Mammogram due on 12/16/2023  ANNUAL REVIEW OF HM ORDERS due on 12/15/2024    Please call us at [phone] (or use [employer]!) to address the above recommendations if needed.            Thank you very much for trusting me and [facility].     Have a peaceful day.    Healthy regards,  Hector Samuel MD

## 2024-12-19 ENCOUNTER — PATIENT OUTREACH (OUTPATIENT)
Dept: CARE COORDINATION | Facility: CLINIC | Age: 64
End: 2024-12-19
Payer: MEDICARE

## 2025-02-17 ENCOUNTER — OFFICE VISIT (OUTPATIENT)
Dept: URGENT CARE | Facility: URGENT CARE | Age: 65
End: 2025-02-17
Payer: MEDICARE

## 2025-02-17 VITALS
DIASTOLIC BLOOD PRESSURE: 66 MMHG | TEMPERATURE: 97.8 F | BODY MASS INDEX: 21.08 KG/M2 | WEIGHT: 132.6 LBS | HEART RATE: 73 BPM | RESPIRATION RATE: 21 BRPM | SYSTOLIC BLOOD PRESSURE: 119 MMHG | OXYGEN SATURATION: 95 %

## 2025-02-17 DIAGNOSIS — S61.012A THUMB LACERATION, LEFT, INITIAL ENCOUNTER: ICD-10-CM

## 2025-02-17 DIAGNOSIS — Z23 NEED FOR TDAP VACCINATION: Primary | ICD-10-CM

## 2025-02-17 PROCEDURE — 99213 OFFICE O/P EST LOW 20 MIN: CPT | Performed by: PHYSICIAN ASSISTANT

## 2025-02-17 NOTE — PROGRESS NOTES
SUBJECTIVE:  Shira Li is a 64 year old female who comes in with concerns for small cut to her left thumb yesterday.  Patient states that she was cutting softener salt bags with a small hand saw.  States that accidentally got a small cut on her thumb.  There was no bleeding.  States that when she looked closely at the site she noticed that there was rust on it.  She did clean it.  States that she is not current on her tetanus shot is here further injection.  She denies any numbness or tingling in her finger.  She is otherwise at baseline health.    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      coenzyme Q-10 (CO-Q10) 50 MG capsule Take 1 capsule (50 mg) by mouth daily.      Multiple vitamin  s TABS Take by mouth. 30 tablet     Multiple Vitamins-Minerals (PRESERVISION AREDS 2) CAPS Take by mouth.      Omega-3 Fish Oil 500 MG capsule Take 2 capsules (1,000 mg) by mouth daily.      Vitamin D3 (VITAMIN D, CHOLECALCIFEROL,) 25 mcg (1000 units) tablet Take by mouth daily. 5000 units daily       No current facility-administered medications for this visit.     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 Not Asked     Service No    Blood Transfusions No    Caffeine Concern No     Comment: herbal tea    Occupational Exposure Not Asked    Hobby Hazards Not Asked    Sleep Concern Yes     Comment: has improved    Stress Concern Not Asked    Weight Concern Not Asked    Special Diet Yes     Comment: No wheat , No oats, No barley, No rye, no dairy    Back Care Not Asked    Exercise Yes     Comment: 1 hour on bike daily    Bike Helmet Not Asked    Seat Belt Yes    Self-Exams Not Asked   Social History Narrative    Lived in area where tannery dumped chemicals into water source.     High incident of childhood leukemia and cancers in this area.    [facility].                  Social Drivers of Health     Financial Resource Strain: Low Risk  (12/16/2024)    Financial Resource Strain     Within the past 12 months, have you or your family members you live with been unable to get utilities (heat, electricity) when it was really needed?: No   Food Insecurity: Low Risk  (12/16/2024)    Food Insecurity     Within the past 12 months, did you worry that your food would run out before you got money to buy more?: No     Within the past 12 months, did the food you bought just not last and you didn t have money to get more?: No   Transportation Needs: Low Risk  (12/16/2024)    Transportation Needs     Within the past 12 months, has lack of transportation kept you from medical appointments, getting your medicines, non-medical meetings or appointments, work, or from getting things that you need?: No   Physical Activity: Unknown (12/16/2024)    Exercise Vital Sign     Days of Exercise per Week: Patient declined     Minutes of Exercise per Session: Not on file   Stress: No Stress Concern Present (12/16/2024)    [employer]  [place] [employer] - Occupational Stress Questionnaire     Feeling of Stress : Only a little   Social Connections: Unknown (12/16/2024)    Social Connection and Isolation Panel [NHANES]     Frequency of Communication with Friends and Family: Not on file     Frequency of Social Gatherings with Friends and Family: Three times a week     Attends Yarsani Services: Not on file     Active Member of Clubs or Organizations: Not on file     Attends Club or Organization Meetings: Not on file     Marital Status: Not on file   Interpersonal Safety: Low Risk  (8/26/2024)    Interpersonal Safety     Do you feel physically and emotionally safe where you currently live?: Yes     Within the past 12 months, have you been hit, slapped, kicked or otherwise physically hurt by someone?: No     Within the past 12 months, have you been humiliated or emotionally abused in other ways by your partner or ex-partner?: No   Housing Stability: Low Risk  (12/16/2024)    Housing Stability     Do you have housing? : Yes     Are you worried about losing your housing?: No     ROS  negative other than stated above    Exam:  GENERAL APPEARANCE: healthy, alert and no distress  EYES: EOMI,  PERRL  MS: extremities normal- no gross deformities noted, no evidence of inflammation in joints, FROM in all extremities.  SKIN: Left thumb with superficial cut with no active bleeding redness or signs of infection.  Tendon function fully intact.  No tenderness to palpation.  NEURO: Normal strength and tone, sensory exam grossly normal, mentation intact and speech normal    assessment/plan:  (Z23) Need for Tdap vaccination  (primary encounter diagnosis)  Comment:   Plan: Tdap, tetanus-diptheria-acell pertussis,         (BOOSTRIX) 5-2.5-18.5 LF-MCG/0.5 PATRIZIA         injection, Tdap, tetanus-diptheria-acell         pertussis, (BOOSTRIX) 5-2.5-18.5 LF-MCG/0.5         PATRIZIA injection, DISCONTINUED: Tdap,         tetanus-diptheria-acell pertussis, (BOOSTRIX)          5-2.5-18.5 LF-MCG/0.5 PATRIZIA injection          Patient comes in with need for tetanus shot after she cut her left thumb with a kiki saw opening somersault bags.  There is no laceration that needs repair.  No signs of infection.  Adacel was updated.  Due to her insurance she needed to have this performed in the pharmacy and order was placed.  On follow-up as needed.    (S62.336A) Thumb laceration, left, initial encounter  Comment:   Plan: Tdap, tetanus-diptheria-acell pertussis,         (BOOSTRIX) 5-2.5-18.5 LF-MCG/0.5 PATRIZIA         injection, Tdap, tetanus-diptheria-acell         pertussis, (BOOSTRIX) 5-2.5-18.5 LF-MCG/0.5         PATRIZIA injection

## 2025-07-12 ENCOUNTER — HEALTH MAINTENANCE LETTER (OUTPATIENT)
Age: 65
End: 2025-07-12

## (undated) DEVICE — SU VICRYL 4-0 PS-2 18" UND J496H

## (undated) DEVICE — Device

## (undated) DEVICE — SUCTION MANIFOLD NEPTUNE 2 SYS 4 PORT 0702-020-000

## (undated) DEVICE — ESU GROUND PAD ADULT W/CORD E7507

## (undated) DEVICE — ENDO TROCAR FIRST ENTRY KII FIOS Z-THRD 12X100MM CTF73

## (undated) DEVICE — ENDO TROCAR SLEEVE KII Z-THREADED 05X100MM CTS02

## (undated) DEVICE — SUCTION IRR STRYKERFLOW II W/TIP 250-070-520

## (undated) DEVICE — RIGID LIGHT HANDLE

## (undated) DEVICE — COVER CAMERA IN-LIGHT DISP LT-C02

## (undated) DEVICE — GLOVE BIOGEL PI MICRO SZ 7.5 48575

## (undated) DEVICE — SU VICRYL+ 0 54 UNDYED VCP608H

## (undated) DEVICE — ESU ENDO SCISSORS 5MM CVD 5DCS

## (undated) DEVICE — PREP CHLORAPREP 26ML TINTED HI-LITE ORANGE 930815

## (undated) DEVICE — ESU HOLDER LAP INST DISP PURPLE LONG 330MM H-PRO-330

## (undated) DEVICE — DRSG STERI STRIP 1/2X4" R1547

## (undated) DEVICE — CLIP ENDO HEMO-LOC PURPLE LG 544240

## (undated) DEVICE — DEVICE SUTURE PASSER 14GA WECK EFX EFXSP2

## (undated) DEVICE — ENDO TROCAR FIRST ENTRY KII FIOS Z-THRD 05X100MM CTF03

## (undated) DEVICE — PAD CHUX UNDERPAD 23X24" 7136

## (undated) DEVICE — DRAPE SHEET REV FOLD 3/4 9349

## (undated) DEVICE — SOL NACL 0.9% INJ 1000ML BAG 2B1324X

## (undated) DEVICE — LINEN TOWEL PACK X6 WHITE 5487

## (undated) DEVICE — LINEN TOWEL PACK X30 5481

## (undated) DEVICE — ADH LIQUID MASTISOL TOPICAL VIAL 2-3ML 0523-48

## (undated) DEVICE — ENDO POUCH UNIV RETRIEVAL SYSTEM INZII 10MM CD001

## (undated) DEVICE — ANTIFOG SOLUTION W/FOAM PAD 31142527

## (undated) DEVICE — DECANTER VIAL 2006S

## (undated) RX ORDER — FENTANYL CITRATE 50 UG/ML
INJECTION, SOLUTION INTRAMUSCULAR; INTRAVENOUS
Status: DISPENSED
Start: 2024-11-08

## (undated) RX ORDER — INDOCYANINE GREEN AND WATER 25 MG
KIT INJECTION
Status: DISPENSED
Start: 2024-11-08

## (undated) RX ORDER — CEFAZOLIN SODIUM/WATER 2 G/20 ML
SYRINGE (ML) INTRAVENOUS
Status: DISPENSED
Start: 2024-11-08

## (undated) RX ORDER — FENTANYL CITRATE-0.9 % NACL/PF 10 MCG/ML
PLASTIC BAG, INJECTION (ML) INTRAVENOUS
Status: DISPENSED
Start: 2024-11-08

## (undated) RX ORDER — BUPIVACAINE HYDROCHLORIDE AND EPINEPHRINE 2.5; 5 MG/ML; UG/ML
INJECTION, SOLUTION EPIDURAL; INFILTRATION; INTRACAUDAL; PERINEURAL
Status: DISPENSED
Start: 2024-11-08

## (undated) RX ORDER — KETOROLAC TROMETHAMINE 30 MG/ML
INJECTION, SOLUTION INTRAMUSCULAR; INTRAVENOUS
Status: DISPENSED
Start: 2024-11-08

## (undated) RX ORDER — EPHEDRINE SULFATE 50 MG/ML
INJECTION, SOLUTION INTRAMUSCULAR; INTRAVENOUS; SUBCUTANEOUS
Status: DISPENSED
Start: 2024-11-08